# Patient Record
Sex: MALE | Race: WHITE | Employment: FULL TIME | URBAN - METROPOLITAN AREA
[De-identification: names, ages, dates, MRNs, and addresses within clinical notes are randomized per-mention and may not be internally consistent; named-entity substitution may affect disease eponyms.]

---

## 2017-04-20 ENCOUNTER — APPOINTMENT (OUTPATIENT)
Dept: CT IMAGING | Age: 47
End: 2017-04-20
Attending: PHYSICIAN ASSISTANT
Payer: COMMERCIAL

## 2017-04-20 ENCOUNTER — HOSPITAL ENCOUNTER (EMERGENCY)
Age: 47
Discharge: HOME | End: 2017-04-20
Attending: EMERGENCY MEDICINE
Payer: COMMERCIAL

## 2017-04-20 VITALS
OXYGEN SATURATION: 96 % | WEIGHT: 300 LBS | HEIGHT: 72 IN | DIASTOLIC BLOOD PRESSURE: 90 MMHG | RESPIRATION RATE: 18 BRPM | BODY MASS INDEX: 40.63 KG/M2 | TEMPERATURE: 98 F | SYSTOLIC BLOOD PRESSURE: 143 MMHG | HEART RATE: 91 BPM

## 2017-04-20 DIAGNOSIS — B27.90 INFECTIOUS MONONUCLEOSIS WITHOUT COMPLICATION, INFECTIOUS MONONUCLEOSIS DUE TO UNSPECIFIED ORGANISM: Primary | ICD-10-CM

## 2017-04-20 PROCEDURE — 99284 EMERGENCY DEPT VISIT MOD MDM: CPT

## 2017-04-20 PROCEDURE — 80048 BASIC METABOLIC PNL TOTAL CA: CPT | Performed by: PHYSICIAN ASSISTANT

## 2017-04-20 PROCEDURE — 87430 STREP A AG IA: CPT | Performed by: PHYSICIAN ASSISTANT

## 2017-04-20 PROCEDURE — 70491 CT SOFT TISSUE NECK W/DYE: CPT

## 2017-04-20 PROCEDURE — 87081 CULTURE SCREEN ONLY: CPT | Performed by: PHYSICIAN ASSISTANT

## 2017-04-20 PROCEDURE — 96374 THER/PROPH/DIAG INJ IV PUSH: CPT

## 2017-04-20 PROCEDURE — 86403 PARTICLE AGGLUT ANTBDY SCRN: CPT | Performed by: PHYSICIAN ASSISTANT

## 2017-04-20 PROCEDURE — 87798 DETECT AGENT NOS DNA AMP: CPT | Performed by: PHYSICIAN ASSISTANT

## 2017-04-20 PROCEDURE — 85025 COMPLETE CBC W/AUTO DIFF WBC: CPT | Performed by: PHYSICIAN ASSISTANT

## 2017-04-20 RX ORDER — DEXAMETHASONE SODIUM PHOSPHATE 4 MG/ML
10 VIAL (ML) INJECTION ONCE
Status: COMPLETED | OUTPATIENT
Start: 2017-04-20 | End: 2017-04-20

## 2017-04-20 RX ORDER — HYDROCODONE BITARTRATE AND ACETAMINOPHEN 5; 325 MG/1; MG/1
1-2 TABLET ORAL EVERY 4 HOURS PRN
Qty: 20 TABLET | Refills: 0 | Status: SHIPPED | OUTPATIENT
Start: 2017-04-20 | End: 2017-04-30

## 2017-04-20 RX ORDER — KETOROLAC TROMETHAMINE 30 MG/ML
30 INJECTION, SOLUTION INTRAMUSCULAR; INTRAVENOUS ONCE
Status: COMPLETED | OUTPATIENT
Start: 2017-04-20 | End: 2017-04-20

## 2017-04-20 NOTE — ED PROVIDER NOTES
Patient Seen in: [facility] Emergency Department In [place]    History   Patient presents with:  Cough/URI  Lymph Node    Stated Complaint: sinus congestion, swelling to lymph node in neck    HPI    Ronaldo Lane is a 55-year-old male who presents for evaluation o 136.079 kg  BMI 40.68 kg/m2  SpO2 96%        Physical Exam   Constitutional: He is oriented to person, place, and time. He appears well-developed and well-nourished. No distress. HENT:   Head: Normocephalic and atraumatic.    Right Ear: Tympanic membrane, created for panel order CBC WITH DIFFERENTIAL WITH PLATELET.   Procedure                               Abnormality         Status                     ---------                               -----------         ------                     CBC W/ DIFFERENTIAL[ Prominent bilateral lymph nodes, left greater than right, which likely correspond to palpable abnormality.     Dictated by: Ventura Salamanca MD on 4/20/2017 at 16:54     Approved by: Ventura Salamanca MD            This case is discussed with Dr. Coral Borges who e

## 2017-04-20 NOTE — ED INITIAL ASSESSMENT (HPI)
Pt states he has sinus congestion for the past 4 days with sore throat and swelling to lymph node to lt ant neck.

## 2017-04-22 RX ORDER — AMOXICILLIN 875 MG/1
875 TABLET, COATED ORAL 2 TIMES DAILY
Qty: 20 TABLET | Refills: 0 | Status: SHIPPED | OUTPATIENT
Start: 2017-04-22 | End: 2017-05-02

## 2017-04-24 NOTE — ED NOTES
Unable to reach patient invalid number in chart. RX for amox sent by certified letter to patient home.

## 2017-07-11 PROBLEM — R94.31 ABNORMAL EKG: Status: ACTIVE | Noted: 2017-07-11

## 2017-07-11 PROBLEM — Z01.810 PRE-OPERATIVE CARDIOVASCULAR EXAMINATION: Status: ACTIVE | Noted: 2017-07-11

## 2023-01-23 ENCOUNTER — OFFICE VISIT (OUTPATIENT)
Dept: FAMILY MEDICINE CLINIC | Facility: CLINIC | Age: 53
End: 2023-01-23
Payer: COMMERCIAL

## 2023-01-23 VITALS
BODY MASS INDEX: 42.66 KG/M2 | OXYGEN SATURATION: 98 % | HEART RATE: 96 BPM | WEIGHT: 315 LBS | HEIGHT: 72 IN | RESPIRATION RATE: 16 BRPM | SYSTOLIC BLOOD PRESSURE: 110 MMHG | DIASTOLIC BLOOD PRESSURE: 80 MMHG

## 2023-01-23 DIAGNOSIS — Z00.00 ROUTINE GENERAL MEDICAL EXAMINATION AT A HEALTH CARE FACILITY: Primary | ICD-10-CM

## 2023-01-23 DIAGNOSIS — E78.2 MIXED HYPERLIPIDEMIA: ICD-10-CM

## 2023-01-23 DIAGNOSIS — Z12.11 SCREENING FOR COLON CANCER: ICD-10-CM

## 2023-01-23 DIAGNOSIS — I10 HYPERTENSION, UNSPECIFIED TYPE: ICD-10-CM

## 2023-01-23 DIAGNOSIS — E11.9 TYPE 2 DIABETES MELLITUS WITHOUT COMPLICATION, WITHOUT LONG-TERM CURRENT USE OF INSULIN (HCC): ICD-10-CM

## 2023-01-23 DIAGNOSIS — Z12.5 SCREENING FOR PROSTATE CANCER: ICD-10-CM

## 2023-01-23 DIAGNOSIS — Z00.00 LABORATORY EXAM ORDERED AS PART OF ROUTINE GENERAL MEDICAL EXAMINATION: ICD-10-CM

## 2023-01-23 PROCEDURE — 3074F SYST BP LT 130 MM HG: CPT | Performed by: NURSE PRACTITIONER

## 2023-01-23 PROCEDURE — 3079F DIAST BP 80-89 MM HG: CPT | Performed by: NURSE PRACTITIONER

## 2023-01-23 PROCEDURE — 3008F BODY MASS INDEX DOCD: CPT | Performed by: NURSE PRACTITIONER

## 2023-01-23 PROCEDURE — 99386 PREV VISIT NEW AGE 40-64: CPT | Performed by: NURSE PRACTITIONER

## 2023-01-23 RX ORDER — ATORVASTATIN CALCIUM 10 MG/1
10 TABLET, FILM COATED ORAL DAILY
Qty: 90 TABLET | Refills: 1 | Status: SHIPPED | OUTPATIENT
Start: 2023-01-23

## 2023-01-23 RX ORDER — TAMSULOSIN HYDROCHLORIDE 0.4 MG/1
0.4 CAPSULE ORAL DAILY
Qty: 90 CAPSULE | Refills: 1 | Status: SHIPPED | OUTPATIENT
Start: 2023-01-23 | End: 2023-04-23

## 2023-01-23 RX ORDER — BLOOD-GLUCOSE METER
1 EACH MISCELLANEOUS 2 TIMES DAILY
Qty: 1 KIT | Refills: 0 | COMMUNITY
Start: 2023-01-23 | End: 2024-01-23

## 2023-01-23 RX ORDER — BLOOD SUGAR DIAGNOSTIC
STRIP MISCELLANEOUS
Qty: 100 EACH | Refills: 2 | Status: SHIPPED | OUTPATIENT
Start: 2023-01-23

## 2023-01-23 RX ORDER — ATORVASTATIN CALCIUM 10 MG/1
10 TABLET, FILM COATED ORAL DAILY
COMMUNITY
Start: 2022-08-02 | End: 2023-01-23

## 2023-01-23 RX ORDER — LISINOPRIL AND HYDROCHLOROTHIAZIDE 25; 20 MG/1; MG/1
1 TABLET ORAL DAILY
Qty: 90 TABLET | Refills: 1 | Status: SHIPPED | OUTPATIENT
Start: 2023-01-23

## 2023-01-23 RX ORDER — LANCETS 33 GAUGE
1 EACH MISCELLANEOUS DAILY
Qty: 100 EACH | Refills: 2 | Status: SHIPPED | OUTPATIENT
Start: 2023-01-23 | End: 2024-01-23

## 2023-01-23 RX ORDER — TADALAFIL 20 MG/1
TABLET ORAL
COMMUNITY
Start: 2022-08-21

## 2023-01-23 RX ORDER — LISINOPRIL AND HYDROCHLOROTHIAZIDE 25; 20 MG/1; MG/1
1 TABLET ORAL DAILY
COMMUNITY
Start: 2022-08-02 | End: 2023-01-23

## 2023-01-24 ENCOUNTER — LAB ENCOUNTER (OUTPATIENT)
Dept: LAB | Age: 53
End: 2023-01-24
Attending: NURSE PRACTITIONER
Payer: COMMERCIAL

## 2023-01-24 DIAGNOSIS — I10 HYPERTENSION, UNSPECIFIED TYPE: ICD-10-CM

## 2023-01-24 DIAGNOSIS — E78.2 MIXED HYPERLIPIDEMIA: ICD-10-CM

## 2023-01-24 DIAGNOSIS — Z00.00 LABORATORY EXAM ORDERED AS PART OF ROUTINE GENERAL MEDICAL EXAMINATION: ICD-10-CM

## 2023-01-24 DIAGNOSIS — Z12.5 SCREENING FOR PROSTATE CANCER: ICD-10-CM

## 2023-01-24 LAB
ALBUMIN SERPL-MCNC: 3.9 G/DL (ref 3.4–5)
ALBUMIN/GLOB SERPL: 1.1 {RATIO} (ref 1–2)
ALP LIVER SERPL-CCNC: 44 U/L
ALT SERPL-CCNC: 56 U/L
AMYLASE SERPL-CCNC: 65 U/L (ref 25–115)
ANION GAP SERPL CALC-SCNC: 8 MMOL/L (ref 0–18)
AST SERPL-CCNC: 35 U/L (ref 15–37)
BASOPHILS # BLD AUTO: 0.03 X10(3) UL (ref 0–0.2)
BASOPHILS NFR BLD AUTO: 0.7 %
BILIRUB SERPL-MCNC: 0.6 MG/DL (ref 0.1–2)
BUN BLD-MCNC: 13 MG/DL (ref 7–18)
CALCIUM BLD-MCNC: 9.1 MG/DL (ref 8.5–10.1)
CHLORIDE SERPL-SCNC: 108 MMOL/L (ref 98–112)
CHOLEST SERPL-MCNC: 149 MG/DL (ref ?–200)
CO2 SERPL-SCNC: 25 MMOL/L (ref 21–32)
COMPLEXED PSA SERPL-MCNC: 0.3 NG/ML (ref ?–4)
CREAT BLD-MCNC: 0.78 MG/DL
CREAT UR-SCNC: 231 MG/DL
EOSINOPHIL # BLD AUTO: 0.04 X10(3) UL (ref 0–0.7)
EOSINOPHIL NFR BLD AUTO: 0.9 %
ERYTHROCYTE [DISTWIDTH] IN BLOOD BY AUTOMATED COUNT: 12.9 %
EST. AVERAGE GLUCOSE BLD GHB EST-MCNC: 146 MG/DL (ref 68–126)
FASTING PATIENT LIPID ANSWER: YES
FASTING STATUS PATIENT QL REPORTED: YES
GFR SERPLBLD BASED ON 1.73 SQ M-ARVRAT: 107 ML/MIN/1.73M2 (ref 60–?)
GLOBULIN PLAS-MCNC: 3.7 G/DL (ref 2.8–4.4)
GLUCOSE BLD-MCNC: 124 MG/DL (ref 70–99)
HBA1C MFR BLD: 6.7 % (ref ?–5.7)
HCT VFR BLD AUTO: 46.5 %
HDLC SERPL-MCNC: 40 MG/DL (ref 40–59)
HGB BLD-MCNC: 15.6 G/DL
IMM GRANULOCYTES # BLD AUTO: 0.01 X10(3) UL (ref 0–1)
IMM GRANULOCYTES NFR BLD: 0.2 %
LDLC SERPL CALC-MCNC: 93 MG/DL (ref ?–100)
LIPASE SERPL-CCNC: 296 U/L (ref 73–393)
LYMPHOCYTES # BLD AUTO: 1.65 X10(3) UL (ref 1–4)
LYMPHOCYTES NFR BLD AUTO: 38.7 %
MCH RBC QN AUTO: 28.7 PG (ref 26–34)
MCHC RBC AUTO-ENTMCNC: 33.5 G/DL (ref 31–37)
MCV RBC AUTO: 85.6 FL
MICROALBUMIN UR-MCNC: 2.08 MG/DL
MICROALBUMIN/CREAT 24H UR-RTO: 9 UG/MG (ref ?–30)
MONOCYTES # BLD AUTO: 0.34 X10(3) UL (ref 0.1–1)
MONOCYTES NFR BLD AUTO: 8 %
NEUTROPHILS # BLD AUTO: 2.19 X10 (3) UL (ref 1.5–7.7)
NEUTROPHILS # BLD AUTO: 2.19 X10(3) UL (ref 1.5–7.7)
NEUTROPHILS NFR BLD AUTO: 51.5 %
NONHDLC SERPL-MCNC: 109 MG/DL (ref ?–130)
OSMOLALITY SERPL CALC.SUM OF ELEC: 294 MOSM/KG (ref 275–295)
PLATELET # BLD AUTO: 101 10(3)UL (ref 150–450)
POTASSIUM SERPL-SCNC: 3.9 MMOL/L (ref 3.5–5.1)
PROT SERPL-MCNC: 7.6 G/DL (ref 6.4–8.2)
RBC # BLD AUTO: 5.43 X10(6)UL
SODIUM SERPL-SCNC: 141 MMOL/L (ref 136–145)
TRIGL SERPL-MCNC: 84 MG/DL (ref 30–149)
TSI SER-ACNC: 1.57 MIU/ML (ref 0.36–3.74)
VIT D+METAB SERPL-MCNC: 28.7 NG/ML (ref 30–100)
VLDLC SERPL CALC-MCNC: 14 MG/DL (ref 0–30)
WBC # BLD AUTO: 4.3 X10(3) UL (ref 4–11)

## 2023-01-24 PROCEDURE — 82570 ASSAY OF URINE CREATININE: CPT | Performed by: NURSE PRACTITIONER

## 2023-01-24 PROCEDURE — 80061 LIPID PANEL: CPT | Performed by: NURSE PRACTITIONER

## 2023-01-24 PROCEDURE — 83690 ASSAY OF LIPASE: CPT | Performed by: NURSE PRACTITIONER

## 2023-01-24 PROCEDURE — 84153 ASSAY OF PSA TOTAL: CPT | Performed by: NURSE PRACTITIONER

## 2023-01-24 PROCEDURE — 83036 HEMOGLOBIN GLYCOSYLATED A1C: CPT | Performed by: NURSE PRACTITIONER

## 2023-01-24 PROCEDURE — 80050 GENERAL HEALTH PANEL: CPT | Performed by: NURSE PRACTITIONER

## 2023-01-24 PROCEDURE — 82150 ASSAY OF AMYLASE: CPT | Performed by: NURSE PRACTITIONER

## 2023-01-24 PROCEDURE — 3061F NEG MICROALBUMINURIA REV: CPT | Performed by: FAMILY MEDICINE

## 2023-01-24 PROCEDURE — 82043 UR ALBUMIN QUANTITATIVE: CPT | Performed by: NURSE PRACTITIONER

## 2023-01-24 PROCEDURE — 3044F HG A1C LEVEL LT 7.0%: CPT | Performed by: FAMILY MEDICINE

## 2023-01-24 PROCEDURE — 82306 VITAMIN D 25 HYDROXY: CPT | Performed by: NURSE PRACTITIONER

## 2023-01-25 ENCOUNTER — TELEPHONE (OUTPATIENT)
Dept: FAMILY MEDICINE CLINIC | Facility: CLINIC | Age: 53
End: 2023-01-25

## 2023-01-25 NOTE — TELEPHONE ENCOUNTER
Patient come in and filled out JASMIN to have his labs from 1/24/23 faxed to Dr. Michelle Lucas [phone]    Telephone [phone]

## 2023-01-25 NOTE — TELEPHONE ENCOUNTER
Explained lab results from 1/24/2023. Patient verbalized understanding. Patient also stated that he would like to establish care at [facility]. Notified patient that Dr. Keyur Ruiz is accepting new patients. Patient stated that he would like Dr. Keyur Ruiz as PCP. Advised to schedule F/U appointment with PCP. Patient verbalized understanding and agreed. Appointment scheduled for 2/24/2023.

## 2023-01-25 NOTE — TELEPHONE ENCOUNTER
----- Message from KOFFI Wallace sent at 1/25/2023  8:17 AM CST -----  Labs are good , continue medications as prescribed

## 2023-01-26 ENCOUNTER — OFFICE VISIT (OUTPATIENT)
Facility: LOCATION | Age: 53
End: 2023-01-26
Payer: COMMERCIAL

## 2023-01-26 VITALS — TEMPERATURE: 97 F | HEART RATE: 83 BPM

## 2023-01-26 DIAGNOSIS — Z12.11 ENCOUNTER FOR SCREENING COLONOSCOPY: Primary | ICD-10-CM

## 2023-01-26 PROCEDURE — S0285 CNSLT BEFORE SCREEN COLONOSC: HCPCS | Performed by: PHYSICIAN ASSISTANT

## 2023-01-26 RX ORDER — POLYETHYLENE GLYCOL 3350, SODIUM CHLORIDE, SODIUM BICARBONATE, POTASSIUM CHLORIDE 420; 11.2; 5.72; 1.48 G/4L; G/4L; G/4L; G/4L
POWDER, FOR SOLUTION ORAL
Qty: 1 EACH | Refills: 0 | Status: SHIPPED | OUTPATIENT
Start: 2023-01-26

## 2023-01-26 NOTE — PROGRESS NOTES
New Patient Visit Note       Active Problems      1. Encounter for screening colonoscopy        Chief Complaint   Patient presents with:  Colonoscopy: NP- Cscope Consult- Pt. Denies rectal pain or bleeding, denies previous colonoscopies, denies family hx of colon cancer, BMs are normal.       PCP  Silvio Baker MD     History of Present Illness   Hugo Power is a 46year old male who presents for evaluation for colonoscopy. The patient has not had previous colonoscopy. The patient's family history is negative for colon disease or cancer . The patient denies nausea, vomiting, abdominal pain or cramping or bloating, diarrhea, constipation, bright red blood in the stool, dark tarry stools, other recent changes in bowel habits, or recent weight loss. The patient states he typically has at least one bowel movement daily. He states it is formed. The patient denies stool softeners or fiber supplements. The patient denies any past medical history. The patient's past surgical history includes gastric lap band which was later removed and a gastric sleeve. The patient takes no blood thinners. .      Past Medical / Surgical / Social / Family History    The past medical history, past surgical history, family history, social history, allergies, and medications have been reviewed by me today. Tadalafil 20 MG Oral Tab, 1 (ONE) TABLET AS NEEDED 2 HOURS PRIOR TO ACTIVITY  metFORMIN 500 MG Oral Tab, Take 1 tablet (500 mg total) by mouth daily with breakfast.  atorvastatin 10 MG Oral Tab, Take 1 tablet (10 mg total) by mouth daily. lisinopril-hydroCHLOROthiazide 20-25 MG Oral Tab, Take 1 tablet by mouth daily. semaglutide 2 MG/1.5ML Subcutaneous Solution Pen-injector, Inject 0.5 mg into the skin once a week. tamsulosin 0.4 MG Oral Cap, Take 1 capsule (0.4 mg total) by mouth daily.   Glucose Blood (ONETOUCH ULTRA) In Vitro Strip, Check blood sugar once daily  Blood Glucose Monitoring Suppl (ONETOUCH ULTRA 2) w/Device Does not apply Kit, 1 Device by Other route 2 (two) times daily. Use as directed. OneTouch Delica Lancets 85L Does not apply Misc, 1 lancet. by Finger stick route daily. No current facility-administered medications on file prior to visit. Review of Systems  Constitutional: No Fever, No Chills, No Diaphoresis, No fatigue, No weight loss, No anorexia  Eyes: No burry vision, No icterus  ENT: No tinnitus, No rhinorrhea, No dysphagia, No odynophagia  Respiratory: No dyspnea, No wheezing  Cardiovascular: No chest pain, No palpitations, No leg swelling  Gastrointestinal: No abdominal pain, No abdominal distention, No bloating, No nausea, No vomiting, No diarrhea, No constipation, No melena, No hematochezia  Endocrine: No polydipsia, No polyuria  Genitoruinary: No dysuria, No Hematuria  Musculoskeletal: No joint pain, No muscle pain  Neurologic: No dizzyness, No syncope, No headaches, No numbness  Hematologic: No easy bruising, No easy bleeding  Psychiatric: No anxiety, No depression      Physical Findings   There were no vitals taken for this visit. Constitutional: Well nourished, well kempt  Eyes: EOMI, no scleral icterus  ENT: Nares moist, oropharynx clear  Neck: Supple, no tracheal deviation   Cardiac: Normal rate, no JVD  Respiratory: No respiratory distress, No audible wheezing  Abdominal: Soft, Nontender, Nondistended  Muskuloskeletal: Normal gait, Full ROM in all extremities  Lymphatic: no cervical or supraclavicular adenopathy  Skin: No rashes,  No lesions  Neurologic: No focal deficits  Psych: Appropriate mood and affect, oriented x3            Assessment   Encounter for screening colonoscopy  (primary encounter diagnosis)      Plan   The patient is 46year old and has not had a colonoscopy. The patient's family history is negative for colon disease or cancer. The patient does not have gastrointestinal symptoms.      Recommend proceeding with colonoscopy on April 5 with  Shayy Coy.    The benefits of colonoscopy, including detection of cancer and polyp removal prior to progression to cancer were discussed. The details of the procedure which include navigation of the colonoscope through the anus, rectum, and colon to evaluate the mucosa and removal of any polyps encountered were discussed as well. The risks of colonoscopy were discussed with the patient and include but are not limited to post-procedure bleeding, infection, colorectal perforation, splenic injury, missed polyps, need for additional surgeries or interventions. All questions were answered and the patient voiced understanding and agreed to proceed with colonoscopy.             Bhakti Adam PA-C

## 2023-02-24 ENCOUNTER — OFFICE VISIT (OUTPATIENT)
Dept: FAMILY MEDICINE CLINIC | Facility: CLINIC | Age: 53
End: 2023-02-24
Payer: COMMERCIAL

## 2023-02-24 VITALS
DIASTOLIC BLOOD PRESSURE: 74 MMHG | WEIGHT: 313 LBS | BODY MASS INDEX: 42.39 KG/M2 | SYSTOLIC BLOOD PRESSURE: 114 MMHG | HEIGHT: 72 IN | HEART RATE: 92 BPM | OXYGEN SATURATION: 97 % | RESPIRATION RATE: 18 BRPM

## 2023-02-24 DIAGNOSIS — I10 ESSENTIAL HYPERTENSION: ICD-10-CM

## 2023-02-24 DIAGNOSIS — D69.6 THROMBOCYTOPENIA (HCC): ICD-10-CM

## 2023-02-24 DIAGNOSIS — Z00.00 LABORATORY EXAMINATION ORDERED AS PART OF A ROUTINE GENERAL MEDICAL EXAMINATION: ICD-10-CM

## 2023-02-24 DIAGNOSIS — E78.2 MIXED HYPERLIPIDEMIA: ICD-10-CM

## 2023-02-24 DIAGNOSIS — E11.9 TYPE 2 DIABETES MELLITUS WITHOUT COMPLICATION, WITHOUT LONG-TERM CURRENT USE OF INSULIN (HCC): Primary | ICD-10-CM

## 2023-02-24 DIAGNOSIS — G47.33 OBSTRUCTIVE SLEEP APNEA SYNDROME: ICD-10-CM

## 2023-02-24 DIAGNOSIS — Z23 NEED FOR PNEUMOCOCCAL VACCINATION: ICD-10-CM

## 2023-02-24 DIAGNOSIS — N40.0 BENIGN PROSTATIC HYPERPLASIA, UNSPECIFIED WHETHER LOWER URINARY TRACT SYMPTOMS PRESENT: ICD-10-CM

## 2023-02-24 PROCEDURE — 90472 IMMUNIZATION ADMIN EACH ADD: CPT | Performed by: FAMILY MEDICINE

## 2023-02-24 PROCEDURE — 90715 TDAP VACCINE 7 YRS/> IM: CPT | Performed by: FAMILY MEDICINE

## 2023-02-24 PROCEDURE — 3008F BODY MASS INDEX DOCD: CPT | Performed by: FAMILY MEDICINE

## 2023-02-24 PROCEDURE — 90471 IMMUNIZATION ADMIN: CPT | Performed by: FAMILY MEDICINE

## 2023-02-24 PROCEDURE — 3078F DIAST BP <80 MM HG: CPT | Performed by: FAMILY MEDICINE

## 2023-02-24 PROCEDURE — 99204 OFFICE O/P NEW MOD 45 MIN: CPT | Performed by: FAMILY MEDICINE

## 2023-02-24 PROCEDURE — 90677 PCV20 VACCINE IM: CPT | Performed by: FAMILY MEDICINE

## 2023-02-24 PROCEDURE — 3074F SYST BP LT 130 MM HG: CPT | Performed by: FAMILY MEDICINE

## 2023-02-28 ENCOUNTER — TELEPHONE (OUTPATIENT)
Dept: HEMATOLOGY/ONCOLOGY | Facility: HOSPITAL | Age: 53
End: 2023-02-28

## 2023-03-13 ENCOUNTER — OFFICE VISIT (OUTPATIENT)
Dept: HEMATOLOGY/ONCOLOGY | Age: 53
End: 2023-03-13
Attending: INTERNAL MEDICINE
Payer: COMMERCIAL

## 2023-03-13 VITALS
WEIGHT: 306.88 LBS | TEMPERATURE: 97 F | DIASTOLIC BLOOD PRESSURE: 74 MMHG | RESPIRATION RATE: 18 BRPM | HEART RATE: 84 BPM | BODY MASS INDEX: 42 KG/M2 | SYSTOLIC BLOOD PRESSURE: 110 MMHG | OXYGEN SATURATION: 93 %

## 2023-03-13 DIAGNOSIS — D69.6 THROMBOCYTOPENIA (HCC): Primary | ICD-10-CM

## 2023-03-13 LAB
FOLATE SERPL-MCNC: 16.2 NG/ML (ref 8.7–?)
HBV CORE AB SERPL QL IA: NONREACTIVE
HBV SURFACE AB SER QL: NONREACTIVE
HBV SURFACE AB SERPL IA-ACNC: <3.1 MIU/ML
HBV SURFACE AG SER-ACNC: <0.1 [IU]/L
HBV SURFACE AG SERPL QL IA: NONREACTIVE
HCV AB SERPL QL IA: NONREACTIVE
PLATELET # BLD AUTO: 107.8 10(3)UL (ref 150–450)
VIT B12 SERPL-MCNC: 986 PG/ML (ref 193–986)

## 2023-03-13 PROCEDURE — 99244 OFF/OP CNSLTJ NEW/EST MOD 40: CPT | Performed by: INTERNAL MEDICINE

## 2023-03-13 NOTE — PROGRESS NOTES
Here for MD consultation for chronic thrombocytopenia  Denies active bleeding  Denies increase bruising  No pain. Meds reviewed.    Education Record    Learner:  Patient    Disease / Diagnosis:    Barriers / Limitations:  None   Comments:    Method:  Brief focused  Comments:    General Topics:   Plan of care reviewed   Comments:    Outcome:  Shows understanding   Comments:

## 2023-04-10 ENCOUNTER — TELEPHONE (OUTPATIENT)
Dept: FAMILY MEDICINE CLINIC | Facility: CLINIC | Age: 53
End: 2023-04-10

## 2023-04-10 NOTE — TELEPHONE ENCOUNTER
Diabetic eye exam report dated 3/28/2023 was received via fax from [facility]. HM updated. Report placed in Dr Nolvia Diaz in-box pending review.

## 2023-04-20 ENCOUNTER — TELEPHONE (OUTPATIENT)
Facility: LOCATION | Age: 53
End: 2023-04-20

## 2023-04-28 ENCOUNTER — TELEPHONE (OUTPATIENT)
Facility: LOCATION | Age: 53
End: 2023-04-28

## 2023-07-19 DIAGNOSIS — I10 HYPERTENSION, UNSPECIFIED TYPE: ICD-10-CM

## 2023-07-19 DIAGNOSIS — E78.2 MIXED HYPERLIPIDEMIA: ICD-10-CM

## 2023-07-19 RX ORDER — ATORVASTATIN CALCIUM 10 MG/1
10 TABLET, FILM COATED ORAL DAILY
Qty: 90 TABLET | Refills: 1 | Status: SHIPPED | OUTPATIENT
Start: 2023-07-19

## 2023-07-19 RX ORDER — LISINOPRIL AND HYDROCHLOROTHIAZIDE 25; 20 MG/1; MG/1
1 TABLET ORAL DAILY
Qty: 90 TABLET | Refills: 1 | Status: SHIPPED | OUTPATIENT
Start: 2023-07-19

## 2023-07-19 RX ORDER — TAMSULOSIN HYDROCHLORIDE 0.4 MG/1
0.4 CAPSULE ORAL DAILY
Qty: 90 CAPSULE | Refills: 1 | Status: SHIPPED | OUTPATIENT
Start: 2023-07-19

## 2023-10-31 ENCOUNTER — MED REC SCAN ONLY (OUTPATIENT)
Dept: FAMILY MEDICINE CLINIC | Facility: CLINIC | Age: 53
End: 2023-10-31

## 2024-01-24 ENCOUNTER — OFFICE VISIT (OUTPATIENT)
Dept: FAMILY MEDICINE CLINIC | Facility: CLINIC | Age: 54
End: 2024-01-24
Payer: COMMERCIAL

## 2024-01-24 ENCOUNTER — TELEPHONE (OUTPATIENT)
Dept: FAMILY MEDICINE CLINIC | Facility: CLINIC | Age: 54
End: 2024-01-24

## 2024-01-24 VITALS
DIASTOLIC BLOOD PRESSURE: 78 MMHG | RESPIRATION RATE: 18 BRPM | SYSTOLIC BLOOD PRESSURE: 118 MMHG | WEIGHT: 314 LBS | HEIGHT: 72 IN | BODY MASS INDEX: 42.53 KG/M2 | OXYGEN SATURATION: 98 % | HEART RATE: 73 BPM

## 2024-01-24 DIAGNOSIS — E78.2 MIXED HYPERLIPIDEMIA: ICD-10-CM

## 2024-01-24 DIAGNOSIS — Z12.11 SCREENING FOR COLON CANCER: ICD-10-CM

## 2024-01-24 DIAGNOSIS — I10 HYPERTENSION, UNSPECIFIED TYPE: ICD-10-CM

## 2024-01-24 DIAGNOSIS — Z00.00 WELLNESS EXAMINATION: Primary | ICD-10-CM

## 2024-01-24 DIAGNOSIS — Z23 NEED FOR SHINGLES VACCINE: ICD-10-CM

## 2024-01-24 DIAGNOSIS — E11.9 TYPE 2 DIABETES MELLITUS WITHOUT COMPLICATION, WITHOUT LONG-TERM CURRENT USE OF INSULIN (HCC): ICD-10-CM

## 2024-01-24 DIAGNOSIS — Z00.00 LABORATORY EXAMINATION ORDERED AS PART OF A ROUTINE GENERAL MEDICAL EXAMINATION: ICD-10-CM

## 2024-01-24 LAB
CARTRIDGE LOT#: 648 NUMERIC
HEMOGLOBIN A1C: 7.4 % (ref 4.3–5.6)

## 2024-01-24 PROCEDURE — 3074F SYST BP LT 130 MM HG: CPT | Performed by: FAMILY MEDICINE

## 2024-01-24 PROCEDURE — 3078F DIAST BP <80 MM HG: CPT | Performed by: FAMILY MEDICINE

## 2024-01-24 PROCEDURE — 3008F BODY MASS INDEX DOCD: CPT | Performed by: FAMILY MEDICINE

## 2024-01-24 PROCEDURE — 90750 HZV VACC RECOMBINANT IM: CPT | Performed by: FAMILY MEDICINE

## 2024-01-24 PROCEDURE — 3051F HG A1C>EQUAL 7.0%<8.0%: CPT | Performed by: FAMILY MEDICINE

## 2024-01-24 PROCEDURE — 99214 OFFICE O/P EST MOD 30 MIN: CPT | Performed by: FAMILY MEDICINE

## 2024-01-24 PROCEDURE — 99396 PREV VISIT EST AGE 40-64: CPT | Performed by: FAMILY MEDICINE

## 2024-01-24 PROCEDURE — 90471 IMMUNIZATION ADMIN: CPT | Performed by: FAMILY MEDICINE

## 2024-01-24 PROCEDURE — 83036 HEMOGLOBIN GLYCOSYLATED A1C: CPT | Performed by: FAMILY MEDICINE

## 2024-01-24 RX ORDER — LISINOPRIL AND HYDROCHLOROTHIAZIDE 25; 20 MG/1; MG/1
1 TABLET ORAL DAILY
Qty: 90 TABLET | Refills: 1 | Status: SHIPPED | OUTPATIENT
Start: 2024-01-24

## 2024-01-24 RX ORDER — TIRZEPATIDE 2.5 MG/.5ML
2.5 INJECTION, SOLUTION SUBCUTANEOUS WEEKLY
Qty: 2 ML | Refills: 0 | Status: SHIPPED | OUTPATIENT
Start: 2024-01-24

## 2024-01-24 RX ORDER — ATORVASTATIN CALCIUM 10 MG/1
10 TABLET, FILM COATED ORAL DAILY
Qty: 90 TABLET | Refills: 1 | Status: SHIPPED | OUTPATIENT
Start: 2024-01-24

## 2024-01-24 RX ORDER — VARDENAFIL HYDROCHLORIDE 20 MG/1
20 TABLET ORAL DAILY PRN
COMMUNITY
Start: 2024-01-14

## 2024-01-24 NOTE — PROGRESS NOTES
HPI:   Randy Eubanks is a 53 year old male who presents for an Annual Health Visit/follow up.    T2DM - stopped ozempic as he felt it wasn't working for him for weight loss. He has been compliant w/ metformin. Denies any side effects. Has been monitoring blood sugar, FBG ~ 120s.  No new numbness, weakness. Feels feet are cold. Denies any hypoglycemic episodes.     Allergies:   No Known Allergies    CURRENT MEDICATIONS   Current Outpatient Medications   Medication Sig Dispense Refill    Vardenafil HCl 20 MG Oral Tab Take 1 tablet (20 mg total) by mouth daily as needed for Erectile Dysfunction.      atorvastatin 10 MG Oral Tab Take 1 tablet (10 mg total) by mouth daily. 90 tablet 1    lisinopril-hydroCHLOROthiazide 20-25 MG Oral Tab Take 1 tablet by mouth daily. 90 tablet 1    metFORMIN 500 MG Oral Tab Take 1 tablet (500 mg total) by mouth daily with breakfast. 90 tablet 1    Tirzepatide (MOUNJARO) 2.5 MG/0.5ML Subcutaneous Solution Pen-injector Inject 2.5 mg into the skin once a week. 2 mL 0    tamsulosin 0.4 MG Oral Cap Take 1 capsule (0.4 mg total) by mouth daily. 90 capsule 1    Glucose Blood (ONETOUCH ULTRA) In Vitro Strip Check blood sugar once daily 100 each 2      HISTORICAL INFORMATION   No past medical history on file.   Past Surgical History:   Procedure Laterality Date    OTHER SURGICAL HISTORY  2009    gastric lap band       Family History   Problem Relation Age of Onset    Heart Attack Father     Heart Surgery Father     Diabetes Mother       SOCIAL HISTORY   Social History     Socioeconomic History    Marital status:    Tobacco Use    Smoking status: Former   Vaping Use    Vaping Use: Never used   Substance and Sexual Activity    Alcohol use: Yes     Comment: occ.    Drug use: No     Social History     Social History Narrative    Not on file        REVIEW OF SYSTEMS:     Constitutional: negative  Eyes: negative  ENT: negative  Respiratory: negative  Cardiovascular:  negative  Gastrointestinal: negative  Integument/Breast: negative  Genitourinary: negative  Heme/Lymph: negative  Musculoskeletal: negative  Neurological: negative  Psych: negative  Endocrine: negative  Allergic/Immune: negative    EXAM:   /78   Pulse 73   Resp 18   Ht 6' (1.829 m)   Wt (!) 314 lb (142.4 kg)   SpO2 98%   BMI 42.59 kg/m²    Wt Readings from Last 6 Encounters:   01/24/24 (!) 314 lb (142.4 kg)   03/13/23 (!) 306 lb 14.4 oz (139.2 kg)   02/24/23 (!) 313 lb (142 kg)   01/23/23 (!) 317 lb (143.8 kg)   07/11/17 (!) 332 lb (150.6 kg)   04/20/17 300 lb (136.1 kg)     Body mass index is 42.59 kg/m².    General: alert, appears stated age, and cooperative  Head: Normocephalic, without obvious abnormality, atraumatic  Eyes: negative  Ears: normal TM's and external ear canals both ears  Nose: Nares normal. Septum midline. Mucosa normal. No drainage or sinus tenderness.  Throat: lips, mucosa, and tongue normal; teeth and gums normal  Neck: no adenopathy, supple, symmetrical, trachea midline, and thyroid not enlarged, symmetric, no tenderness/mass/nodules  Heart: S1, S2 normal, no murmur, click, rub or gallop, regular rate and rhythm  Lungs: clear to auscultation bilaterally  Chest wall: no tenderness  Abdomen: soft, non-tender; bowel sounds normal; no masses,  no organomegaly  : deferred  Back: negative  Extremities: extremities normal, atraumatic, no cyanosis or edema  Pulses: 2+ and symmetric  Skin: Skin color, texture, turgor normal. No rashes or lesions  Lymph Nodes:  No LAD  Neurologic: Grossly normal    Bilateral barefoot skin diabetic exam is normal, visualized feet and the appearance is normal.  Bilateral monofilament/sensation of both feet is normal.  Pulsation pedal pulse exam of both lower legs/feet is normal as well.    ASSESSMENT AND PLAN:   Randy was seen today for well adult and diabetes.    Diagnoses and all orders for this visit:    Wellness examination  -Immunizations: Due for  shingrex  -Metabolic: BMI 42. BP wnl. Due for annual labs   -Cancer screening: due for CRC screening.   -Communicable disease: low risk   -Mental health: no concerns   -Other preventative: follow with dentistry and optometry.   -Lifestyle: Follow a well balanced healthy diet with emphasis on fruits, vegetables, whole grains, lean meats. Limit processed and junk foods. Aim for at least 150 minutes of moderate intensity exercise weekly. Make sure you are staying adequately hydrated. Aim to get 7-9 hours of sleep nightly.     Laboratory examination ordered as part of a routine general medical examination  -     CBC With Differential With Platelet; Future  -     Comp Metabolic Panel (14); Future  -     Lipid Panel; Future  -     TSH W Reflex To Free T4; Future    Type 2 diabetes mellitus without complication, without long-term current use of insulin (HCC)  A1c at 7.4, can benefit from better control. Will add on mounjaro. Continue metformin.   Goal: A1c < 7   Diet & exercise: diabetic diet, regular exericse.   Daily accucheks are appropriate  Hypoglycemic episodes?: none   Labs: UTD  CV: continue statin.   Renal: due   Eye: UTD  Foot: UTD  Immunizations: UTD  RTC in 3mo for T2DM follow up. Send us condition update in 1mo since we are adding on mounjaro, will titrate up as tolerated.     -     Microalb/Creat Ratio, Random Urine; Future  -     HEMOGLOBIN A1C  -     metFORMIN 500 MG Oral Tab; Take 1 tablet (500 mg total) by mouth daily with breakfast.  -     Tirzepatide (MOUNJARO) 2.5 MG/0.5ML Subcutaneous Solution Pen-injector; Inject 2.5 mg into the skin once a week.    Screening for colon cancer  -     COLOGUARD COLON CANCER SCREENING (EXTERNAL)    Mixed hyperlipidemia  Stable, CPM   -     atorvastatin 10 MG Oral Tab; Take 1 tablet (10 mg total) by mouth daily.    Hypertension, unspecified type  Stable, CPM  -     lisinopril-hydroCHLOROthiazide 20-25 MG Oral Tab; Take 1 tablet by mouth daily.    Need for shingles  vaccine  -     ZOSTER VACC RECOMBINANT IM NJX    Primary obstructive sleep apnea of   Intolerant of PAP therapy.   Interested in alternatives. He was referred to ENT for [facility] consultation. Will place referral for [facility] for oral appliance.       Patient Instructions     Health Screening Guidelines, Men Ages 50 to 64   Screening tests and health counseling are a key part of managing your health. A screening test is done to find disorders or diseases in people who don't have any symptoms. Screening tests are not used to diagnose. They are used to find out if more testing is needed. The goal may be to find a disease early so it can be treated with more success. Or the goal may be to find a disease early so you can make lifestyle changes. You may need regular checkups to help you reduce your risk of disease.   Below are guidelines for men ages 50 to 64. Talk with your healthcare provider. Make sure you’re up-to-date on what you need.   We understand gender is a spectrum. We may use gendered terms to talk about anatomy and health risk. Please use this information in a way that works best for you and your provider as you talk about your care.   Screening  Who needs it How often    Unhealthy alcohol use  All men in this age group  At routine exams   Blood pressure All men in this age group  Once a year if your blood pressure is normal. Normal blood pressure is less than 120/80 mm Hg. If your blood pressure is higher than this, follow the advice of your healthcare provider.    Colorectal cancer All men at average risk in this age group  Talk with your healthcare provider about which test below is right for you:   Colonoscopy every 10 years  Flexible sigmoidoscopy every 5 years (or every 10 years with yearly fecal immunochemical test (FIT) stool test)  CT colonography (virtual colonoscopy) every 5 years  Yearly fecal occult blood test  Yearly FIT  Stool DNA test every 1 to 3 years  If you have a test  that is not a colonoscopy and have an abnormal test result, you will need a colonoscopy.   You may need to be screened more or less often. This is based on personal or family health history. Talk with your healthcare provider.    Depression All men in this age group  At routine exams   Type 2 diabetes or prediabetes  All in this age group  At least every 3 years (yearly if your blood sugar has already begun to rise)    Type 2 diabetes All men with prediabetes  Every year   Hepatitis C Men at higher risk for infection. Test 1 time for men born between 1945 and 1965.  Talk with your healthcare provider.    High cholesterol or triglycerides  All men in this age group  At least every 4 to 6 years. Talk with your healthcare provider about your risk. Ask if you should be tested more often.    HIV All men in this age group  At least 1 time. Talk with your healthcare provider about your risk factors. Ask if you should be tested more often.    Lung cancer All men in this age group who are in fairly good health and are at higher risk for lung cancer, and who:   Smoke or quit in the past 15 years  Have a 20-pack per year smoking history (1 pack a day for 20 years or 2 packs a day for 10 years)  Expert groups vary in their advice. Talk with your healthcare provider.  Yearly lung cancer screening with a low-dose CT scan (LDCT). Talk with your healthcare provider.    Obesity All men in this age group  At yearly routine exams    BMI (body mass index) All men in this age group Every year, to help find out if you are at a healthy weight for your height    Prostate cancer All men in this age group, talk with your healthcare provider about risks and benefits of a digital rectal exam (BARBARA) and prostate-specific antigen (PSA) screening  At routine exams if you decide to be tested.    Syphilis Men at higher risk for infection  At routine exams. Talk with your healthcare provider.    Tuberculosis Men at higher risk for infection  Talk  with your healthcare provider    Vision All men in this age group  Talk with your healthcare provider   Health counseling  Who needs it  How often    Diet and exercise Men who are overweight or obese  When diagnosed, and then at routine exams    Sexually transmitted infection (STI) prevention  Men at higher risk for infection  At routine exams; talk with your healthcare provider    Use of tobacco and the health effects it can cause  All men in this age group  Every exam   Evri last reviewed this educational content on 2021 [employer]. All rights reserved. This information is not intended as a substitute for professional medical care. Always follow your healthcare professional's instructions.          The patient indicates understanding of these issues and agrees to the plan.    Problem List:  Patient Active Problem List   Diagnosis    Pre-operative cardiovascular examination    Abnormal EKG    Type 2 diabetes mellitus without complication, without long-term current use of insulin (HCC)    Thrombocytopenia (HCC)    Mixed hyperlipidemia    Essential hypertension    Benign prostatic hyperplasia    Primary obstructive sleep apnea of        Bryson Khan MD  2024  10:08 AM

## 2024-01-24 NOTE — PATIENT INSTRUCTIONS
Health Screening Guidelines, Men Ages 50 to 64   Screening tests and health counseling are a key part of managing your health. A screening test is done to find disorders or diseases in people who don't have any symptoms. Screening tests are not used to diagnose. They are used to find out if more testing is needed. The goal may be to find a disease early so it can be treated with more success. Or the goal may be to find a disease early so you can make lifestyle changes. You may need regular checkups to help you reduce your risk of disease.   Below are guidelines for men ages 50 to 64. Talk with your healthcare provider. Make sure you’re up-to-date on what you need.   We understand gender is a spectrum. We may use gendered terms to talk about anatomy and health risk. Please use this information in a way that works best for you and your provider as you talk about your care.   Screening  Who needs it How often    Unhealthy alcohol use  All men in this age group  At routine exams   Blood pressure All men in this age group  Once a year if your blood pressure is normal. Normal blood pressure is less than 120/80 mm Hg. If your blood pressure is higher than this, follow the advice of your healthcare provider.    Colorectal cancer All men at average risk in this age group  Talk with your healthcare provider about which test below is right for you:   Colonoscopy every 10 years  Flexible sigmoidoscopy every 5 years (or every 10 years with yearly fecal immunochemical test (FIT) stool test)  CT colonography (virtual colonoscopy) every 5 years  Yearly fecal occult blood test  Yearly FIT  Stool DNA test every 1 to 3 years  If you have a test that is not a colonoscopy and have an abnormal test result, you will need a colonoscopy.   You may need to be screened more or less often. This is based on personal or family health history. Talk with your healthcare provider.    Depression All men in this age group  At routine exams   Type 2  diabetes or prediabetes  All in this age group  At least every 3 years (yearly if your blood sugar has already begun to rise)    Type 2 diabetes All men with prediabetes  Every year   Hepatitis C Men at higher risk for infection. Test 1 time for men born between 1945 and 1965.  Talk with your healthcare provider.    High cholesterol or triglycerides  All men in this age group  At least every 4 to 6 years. Talk with your healthcare provider about your risk. Ask if you should be tested more often.    HIV All men in this age group  At least 1 time. Talk with your healthcare provider about your risk factors. Ask if you should be tested more often.    Lung cancer All men in this age group who are in fairly good health and are at higher risk for lung cancer, and who:   Smoke or quit in the past 15 years  Have a 20-pack per year smoking history (1 pack a day for 20 years or 2 packs a day for 10 years)  Expert groups vary in their advice. Talk with your healthcare provider.  Yearly lung cancer screening with a low-dose CT scan (LDCT). Talk with your healthcare provider.    Obesity All men in this age group  At yearly routine exams    BMI (body mass index) All men in this age group Every year, to help find out if you are at a healthy weight for your height    Prostate cancer All men in this age group, talk with your healthcare provider about risks and benefits of a digital rectal exam (BARBARA) and prostate-specific antigen (PSA) screening  At routine exams if you decide to be tested.    Syphilis Men at higher risk for infection  At routine exams. Talk with your healthcare provider.    Tuberculosis Men at higher risk for infection  Talk with your healthcare provider    Vision All men in this age group  Talk with your healthcare provider   Health counseling  Who needs it  How often    Diet and exercise Men who are overweight or obese  When diagnosed, and then at routine exams    Sexually transmitted infection (STI) prevention   Men at higher risk for infection  At routine exams; talk with your healthcare provider    Use of tobacco and the health effects it can cause  All men in this age group  Every exam   Kenan last reviewed this educational content on 5/1/2021 © 2000-2023 [employer]. All rights reserved. This information is not intended as a substitute for professional medical care. Always follow your healthcare professional's instructions.

## 2024-01-25 ENCOUNTER — LAB ENCOUNTER (OUTPATIENT)
Dept: LAB | Age: 54
End: 2024-01-25
Attending: FAMILY MEDICINE
Payer: COMMERCIAL

## 2024-01-25 DIAGNOSIS — Z00.00 LABORATORY EXAMINATION ORDERED AS PART OF A ROUTINE GENERAL MEDICAL EXAMINATION: ICD-10-CM

## 2024-01-25 DIAGNOSIS — E11.9 TYPE 2 DIABETES MELLITUS WITHOUT COMPLICATION, WITHOUT LONG-TERM CURRENT USE OF INSULIN (HCC): ICD-10-CM

## 2024-01-25 LAB
ALBUMIN SERPL-MCNC: 4 G/DL (ref 3.4–5)
ALBUMIN/GLOB SERPL: 1 {RATIO} (ref 1–2)
ALP LIVER SERPL-CCNC: 44 U/L
ANION GAP SERPL CALC-SCNC: 2 MMOL/L (ref 0–18)
AST SERPL-CCNC: 27 U/L (ref 15–37)
BASOPHILS # BLD AUTO: 0.03 X10(3) UL (ref 0–0.2)
BASOPHILS NFR BLD AUTO: 0.4 %
BILIRUB SERPL-MCNC: 1 MG/DL (ref 0.1–2)
BUN BLD-MCNC: 12 MG/DL (ref 9–23)
CALCIUM BLD-MCNC: 9.3 MG/DL (ref 8.5–10.1)
CHLORIDE SERPL-SCNC: 105 MMOL/L (ref 98–112)
CHOLEST SERPL-MCNC: 126 MG/DL (ref ?–200)
CO2 SERPL-SCNC: 32 MMOL/L (ref 21–32)
CREAT BLD-MCNC: 1.03 MG/DL
CREAT UR-SCNC: 278 MG/DL
EGFRCR SERPLBLD CKD-EPI 2021: 87 ML/MIN/1.73M2 (ref 60–?)
EOSINOPHIL # BLD AUTO: 0.08 X10(3) UL (ref 0–0.7)
EOSINOPHIL NFR BLD AUTO: 1.1 %
ERYTHROCYTE [DISTWIDTH] IN BLOOD BY AUTOMATED COUNT: 13.2 %
FASTING PATIENT LIPID ANSWER: YES
FASTING STATUS PATIENT QL REPORTED: YES
GLOBULIN PLAS-MCNC: 4 G/DL (ref 2.8–4.4)
GLUCOSE BLD-MCNC: 130 MG/DL (ref 70–99)
HCT VFR BLD AUTO: 48.8 %
HDLC SERPL-MCNC: 36 MG/DL (ref 40–59)
HGB BLD-MCNC: 16.4 G/DL
IMM GRANULOCYTES # BLD AUTO: 0.03 X10(3) UL (ref 0–1)
IMM GRANULOCYTES NFR BLD: 0.4 %
LDLC SERPL CALC-MCNC: 73 MG/DL (ref ?–100)
LYMPHOCYTES # BLD AUTO: 1.7 X10(3) UL (ref 1–4)
LYMPHOCYTES NFR BLD AUTO: 24 %
MCH RBC QN AUTO: 28 PG (ref 26–34)
MCHC RBC AUTO-ENTMCNC: 33.6 G/DL (ref 31–37)
MCV RBC AUTO: 83.4 FL
MICROALBUMIN UR-MCNC: 1.25 MG/DL
MICROALBUMIN/CREAT 24H UR-RTO: 4.5 UG/MG (ref ?–30)
MONOCYTES # BLD AUTO: 0.52 X10(3) UL (ref 0.1–1)
MONOCYTES NFR BLD AUTO: 7.3 %
NEUTROPHILS # BLD AUTO: 4.72 X10 (3) UL (ref 1.5–7.7)
NEUTROPHILS # BLD AUTO: 4.72 X10(3) UL (ref 1.5–7.7)
NEUTROPHILS NFR BLD AUTO: 66.8 %
NONHDLC SERPL-MCNC: 90 MG/DL (ref ?–130)
OSMOLALITY SERPL CALC.SUM OF ELEC: 290 MOSM/KG (ref 275–295)
PLATELET # BLD AUTO: 96 10(3)UL (ref 150–450)
POTASSIUM SERPL-SCNC: 4.5 MMOL/L (ref 3.5–5.1)
PROT SERPL-MCNC: 8 G/DL (ref 6.4–8.2)
RBC # BLD AUTO: 5.85 X10(6)UL
SODIUM SERPL-SCNC: 139 MMOL/L (ref 136–145)
TRIGL SERPL-MCNC: 85 MG/DL (ref 30–149)
TSI SER-ACNC: 1.13 MIU/ML (ref 0.36–3.74)
VLDLC SERPL CALC-MCNC: 13 MG/DL (ref 0–30)
WBC # BLD AUTO: 7.1 X10(3) UL (ref 4–11)

## 2024-01-25 PROCEDURE — 80053 COMPREHEN METABOLIC PANEL: CPT

## 2024-01-25 PROCEDURE — 84443 ASSAY THYROID STIM HORMONE: CPT

## 2024-01-25 PROCEDURE — 82043 UR ALBUMIN QUANTITATIVE: CPT

## 2024-01-25 PROCEDURE — 85025 COMPLETE CBC W/AUTO DIFF WBC: CPT

## 2024-01-25 PROCEDURE — 82570 ASSAY OF URINE CREATININE: CPT

## 2024-01-25 PROCEDURE — 80061 LIPID PANEL: CPT

## 2024-01-25 NOTE — TELEPHONE ENCOUNTER
Order for LOMN/Rx was sent via fax # [phone] to [facility] per Dr Khan's request. Order sent to scan.

## 2024-01-26 ENCOUNTER — MED REC SCAN ONLY (OUTPATIENT)
Dept: FAMILY MEDICINE CLINIC | Facility: CLINIC | Age: 54
End: 2024-01-26

## 2024-02-09 LAB — AMB EXT COLOGUARD RESULT: POSITIVE

## 2024-02-12 ENCOUNTER — TELEPHONE (OUTPATIENT)
Dept: FAMILY MEDICINE CLINIC | Facility: CLINIC | Age: 54
End: 2024-02-12

## 2024-02-12 DIAGNOSIS — R19.5 POSITIVE COLORECTAL CANCER SCREENING USING COLOGUARD TEST: Primary | ICD-10-CM

## 2024-02-12 NOTE — TELEPHONE ENCOUNTER
Notified the patient of the result and need to follow up with surgeon. Patient verbalized understanding. Provided contact information for Dr. Mendez. Answered all questions at this time.

## 2024-02-12 NOTE — TELEPHONE ENCOUNTER
POSITIVE Cologuard test results report dated 2/9/2024 were received via fax from [facility]. Results on Dr Khan's desk pending review.

## 2024-02-19 DIAGNOSIS — E11.9 TYPE 2 DIABETES MELLITUS WITHOUT COMPLICATION, WITHOUT LONG-TERM CURRENT USE OF INSULIN (HCC): ICD-10-CM

## 2024-02-19 RX ORDER — TIRZEPATIDE 2.5 MG/.5ML
2.5 INJECTION, SOLUTION SUBCUTANEOUS WEEKLY
Qty: 2 ML | Refills: 0 | Status: SHIPPED | OUTPATIENT
Start: 2024-02-19

## 2024-03-14 ENCOUNTER — PATIENT MESSAGE (OUTPATIENT)
Dept: FAMILY MEDICINE CLINIC | Facility: CLINIC | Age: 54
End: 2024-03-14

## 2024-03-14 DIAGNOSIS — E11.9 TYPE 2 DIABETES MELLITUS WITHOUT COMPLICATION, WITHOUT LONG-TERM CURRENT USE OF INSULIN (HCC): Primary | ICD-10-CM

## 2024-03-15 NOTE — TELEPHONE ENCOUNTER
[address] 
[phone] Patient: Jimenez Sky MRN: [id number] :1955 You are allergic to the following Allergen Reactions Codeine Itching Demerol (Meperidine) Nausea Only Ivp (Fd And C Blue No.1) Itching Minoxidil Itching Penicillamine Itching Other (comments) Drops blood pressure Percocet (Oxycodone-Acetaminophen) Nausea and Vomiting Recent Documentation Weight BMI OB Status Smoking Status 108.9 kg 37.59 kg/m2 Hysterectomy Never Smoker Emergency Contacts Name Discharge Info Relation Home Work Mobile Chaz,April  Daughter [21] [phone] [id number] [facility] CAREGIVER [3] Spouse [3] [phone] About your hospitalization You were admitted on:  2017 You last received care in the:  [address] [place] [address] ENDOSCOPY You were discharged on:  2017 Unit phone number:  [phone] Why you were hospitalized Your primary diagnosis was:  Not on File Providers Seen During Your Hospitalizations Provider Role Specialty Primary office phone Jemma Brand MD Attending Provider Gastroenterology [phone] Your Primary Care Physician (PCP) Primary Care Physician Office Phone Office Fax Sindhu WEBER [phone] Follow-up Information None Current Discharge Medication List  
  
CONTINUE these medications which have NOT CHANGED Dose & Instructions Dispensing Information Comments Morning Noon Evening Bedtime  
 albuterol 90 mcg/actuation inhaler Commonly known as:  PROVENTIL HFA, VENTOLIN HFA, PROAIR HFA Your last dose was: Your next dose is:    
   
   
 Dose:  1 Puff Take 1 Puff by inhalation as needed. Refills:  0  
     
   
   
   
  
 allopurinol 300 mg tablet From: Randy Eubanks  To: Bryson Khan  Sent: 3/14/2024 7:15 PM CDT  Subject: Mounjaro refill    Good afternoon Dr. Khan I contacted [facility] to refill my next dosage for mounjaro and they still show that the next refill is 2.5 I should be on the 5 mg can you please call this prescription in for me I truly appreciate it. Thank you.   Commonly known as:  Kristina Madsen Your last dose was: Your next dose is:    
   
   
 Dose:  300 mg Take 300 mg by mouth daily. Refills:  0  
     
   
   
   
  
 amLODIPine-benazepril 5-20 mg per capsule Commonly known as:  Almas Matthew Your last dose was: Your next dose is:    
   
   
 Dose:  1 Cap Take 1 Cap by mouth daily. Refills:  0  
     
   
   
   
  
 bumetanide 2 mg tablet Commonly known as:  Hull Hora Your last dose was: Your next dose is:    
   
   
 Dose:  2 mg Take 2 mg by mouth two (2) times a day. Refills:  0  
     
   
   
   
  
 carvedilol 25 mg tablet Commonly known as:  Wendy Awe Your last dose was: Your next dose is:    
   
   
 Dose:  25 mg Take 25 mg by mouth two (2) times daily (with meals). Refills:  0  
     
   
   
   
  
 cholecalciferol (vitamin D3) 2,000 unit Tab Your last dose was: Your next dose is:    
   
   
 Dose:  2000 Units Take 2,000 Units by mouth daily. Refills:  0  
     
   
   
   
  
 colchicine 0.6 mg tablet Your last dose was: Your next dose is:    
   
   
 Dose:  0.6 mg Take 1 Tab by mouth daily. Quantity:  1 Tab Refills:  0  
     
   
   
   
  
 cyanocobalamin 1,000 mcg tablet Your last dose was: Your next dose is:    
   
   
 Dose:  1000 mcg Take 1,000 mcg by mouth daily. Refills:  0  
     
   
   
   
  
 digoxin 0.125 mg tablet Commonly known as:  LANOXIN Your last dose was: Your next dose is:    
   
   
 Dose:  0.125 mg Take 0.125 mg by mouth daily. Refills:  0 DULoxetine 60 mg capsule Commonly known as:  CYMBALTA Your last dose was: Your next dose is:    
   
   
 Dose:  60 mg Take 60 mg by mouth daily. Refills:  0  
     
   
   
   
  
 gabapentin 400 mg capsule Commonly known as:  NEURONTIN Your last dose was: Your next dose is: 400 mg p.o. 3 times daily and 1200 mg p.o. nightly Quantity:  180 Cap Refills:  2  
     
   
   
   
  
 hyoscyamine 0.125 mg tablet Commonly known as:  Amita Garza Your last dose was: Your next dose is:    
   
   
 Dose:  125 mcg Take 125 mcg by mouth every four (4) hours as needed for Cramping. Refills:  0  
     
   
   
   
  
 lidocaine 5 % Commonly known as:  Cosmo Zambrano Your last dose was: Your next dose is:    
   
   
 Dose:  1 Patch 1 Patch by TransDERmal route every twenty-four (24) hours. Apply patch to the affected area for 12 hours a day and remove for 12 hours a day. Refills:  0 LYRICA 150 mg capsule Generic drug:  pregabalin Your last dose was: Your next dose is:    
   
   
 Dose:  150 mg Take 150 mg by mouth two (2) times a day. Refills:  0 Omeprazole delayed release 20 mg tablet Commonly known as:  PRILOSEC D/R Your last dose was: Your next dose is:    
   
   
 Dose:  20 mg Take 20 mg by mouth two (2) times a day. Refills:  0  
     
   
   
   
  
 spironolactone 25 mg tablet Commonly known as:  ALDACTONE Your last dose was: Your next dose is: Take  by mouth two (2) times a day. Refills:  0  
     
   
   
   
  
 temazepam 15 mg capsule Commonly known as:  RESTORIL Your last dose was: Your next dose is:    
   
   
 Dose:  15 mg Take 15 mg by mouth nightly as needed for Sleep. Refills:  0  
     
   
   
   
  
 TRULANCE 3 mg Tab Generic drug:  plecanatide Your last dose was: Your next dose is:    
   
   
 Dose:  3 mg Take 3 mg by mouth. Refills:  0 VOLTAREN 1 % Gel Generic drug:  diclofenac Your last dose was: Your next dose is:    
   
   
 Apply  to affected area as needed. Refills:  0  
     
   
   
   
  
 zolpidem 10 mg tablet Commonly known as:  AMBIEN Your last dose was: Your next dose is:    
   
   
 Dose:  10 mg Take 10 mg by mouth nightly as needed for Sleep. Refills:  0 Discharge Instructions [address] 
[address] [facility] [address] [facility], [address] 
[phone] Moustapha Orozco [id number] 
1955 COLON DISCHARGE INSTRUCTIONS DISCOMFORT: 
Redness at IV site- apply warm compress to area; if redness or soreness persist- contact your physician There may be a slight amount of blood passed from the rectum Gaseous discomfort- walking, belching will help relieve any discomfort You may not operate a vehicle for 12 hours You may not  engage in an occupation involving machinery or appliances for rest of today You may not  drink alcoholic beverages for at least 12 hours Avoid making any critical decisions for at least 24 hour DIET: 
 High fiber diet.  however -  remember your colon is empty and a heavy meal will produce gas. Avoid these foods:  vegetables, fried / greasy foods, carbonated drinks for today ACTIVITY: It is recommended that you spend the remainder of the day resting -  avoid any strenuous activity. CALL M.D. ANY SIGN OF: Increasing pain, nausea, vomiting Abdominal distension (swelling) New increased bleeding (oral or rectal) Fever (chills) Pain in chest area Bloody discharge from nose or mouth Shortness of breath Post procedure diagnosis:  Normal EUS Follow-up Instructions: 
 
Call Dr. Meir Calderon for any questions or problems. If we took a biopsy please call the office within 2 weeks to discuss your                             pathology results. Telephone # [phone] Discharge Orders None Introducing [facility]! Dear Wesly Milligan: 
Thank you for requesting a [employer] account.   Our records indicate that you already have an active [facility] account. You can access your account anytime at [email or web address] Did you know that you can access your hospital and ER discharge instructions at any time in [facility]? You can also review all of your test results from your hospital stay or ER visit. Additional Information If you have questions, please visit the Frequently Asked Questions section of the [employer] website at [email or web address] Remember, [facility] is NOT to be used for urgent needs. For medical emergencies, dial 911. Now available from your iPhone and Android! General Information Please provide this summary of care documentation to your next provider. Patient Signature:  ____________________________________________________________ Date:  ____________________________________________________________  
  
[facility] Provider Signature:  ____________________________________________________________ Date:  ____________________________________________________________

## 2024-03-26 RX ORDER — TIRZEPATIDE 5 MG/.5ML
5 INJECTION, SOLUTION SUBCUTANEOUS WEEKLY
Qty: 2 ML | Refills: 0 | Status: SHIPPED | OUTPATIENT
Start: 2024-03-26

## 2024-03-30 DIAGNOSIS — I10 HYPERTENSION, UNSPECIFIED TYPE: ICD-10-CM

## 2024-04-01 ENCOUNTER — TELEPHONE (OUTPATIENT)
Dept: FAMILY MEDICINE CLINIC | Facility: CLINIC | Age: 54
End: 2024-04-01

## 2024-04-01 RX ORDER — TAMSULOSIN HYDROCHLORIDE 0.4 MG/1
0.4 CAPSULE ORAL DAILY
Qty: 90 CAPSULE | Refills: 1 | Status: SHIPPED | OUTPATIENT
Start: 2024-04-01

## 2024-04-01 NOTE — TELEPHONE ENCOUNTER
Diabetic eye exam report dated 3/28/2024 was received via fax [facility]. Report placed in Dr Khan's in-box pending review. HM updated.

## 2024-04-10 ENCOUNTER — MED REC SCAN ONLY (OUTPATIENT)
Dept: FAMILY MEDICINE CLINIC | Facility: CLINIC | Age: 54
End: 2024-04-10

## 2024-04-24 ENCOUNTER — OFFICE VISIT (OUTPATIENT)
Dept: FAMILY MEDICINE CLINIC | Facility: CLINIC | Age: 54
End: 2024-04-24
Payer: COMMERCIAL

## 2024-04-24 VITALS
OXYGEN SATURATION: 97 % | HEIGHT: 72 IN | HEART RATE: 87 BPM | DIASTOLIC BLOOD PRESSURE: 70 MMHG | BODY MASS INDEX: 41.72 KG/M2 | RESPIRATION RATE: 18 BRPM | WEIGHT: 308 LBS | SYSTOLIC BLOOD PRESSURE: 118 MMHG

## 2024-04-24 DIAGNOSIS — I10 HYPERTENSION, UNSPECIFIED TYPE: ICD-10-CM

## 2024-04-24 DIAGNOSIS — R19.5 POSITIVE COLORECTAL CANCER SCREENING USING COLOGUARD TEST: ICD-10-CM

## 2024-04-24 DIAGNOSIS — J31.0 RHINITIS, UNSPECIFIED TYPE: ICD-10-CM

## 2024-04-24 DIAGNOSIS — Z23 NEED FOR SHINGLES VACCINE: ICD-10-CM

## 2024-04-24 DIAGNOSIS — E11.9 TYPE 2 DIABETES MELLITUS WITHOUT COMPLICATION, WITHOUT LONG-TERM CURRENT USE OF INSULIN (HCC): Primary | ICD-10-CM

## 2024-04-24 DIAGNOSIS — E78.2 MIXED HYPERLIPIDEMIA: ICD-10-CM

## 2024-04-24 LAB — HEMOGLOBIN A1C: 5.8 % (ref 4.3–5.6)

## 2024-04-24 PROCEDURE — 99214 OFFICE O/P EST MOD 30 MIN: CPT | Performed by: FAMILY MEDICINE

## 2024-04-24 PROCEDURE — 90750 HZV VACC RECOMBINANT IM: CPT | Performed by: FAMILY MEDICINE

## 2024-04-24 PROCEDURE — 83036 HEMOGLOBIN GLYCOSYLATED A1C: CPT | Performed by: FAMILY MEDICINE

## 2024-04-24 PROCEDURE — 90471 IMMUNIZATION ADMIN: CPT | Performed by: FAMILY MEDICINE

## 2024-04-24 PROCEDURE — 3078F DIAST BP <80 MM HG: CPT | Performed by: FAMILY MEDICINE

## 2024-04-24 PROCEDURE — 3044F HG A1C LEVEL LT 7.0%: CPT | Performed by: FAMILY MEDICINE

## 2024-04-24 PROCEDURE — 3061F NEG MICROALBUMINURIA REV: CPT | Performed by: FAMILY MEDICINE

## 2024-04-24 PROCEDURE — 3008F BODY MASS INDEX DOCD: CPT | Performed by: FAMILY MEDICINE

## 2024-04-24 PROCEDURE — 3074F SYST BP LT 130 MM HG: CPT | Performed by: FAMILY MEDICINE

## 2024-04-24 RX ORDER — ATORVASTATIN CALCIUM 10 MG/1
10 TABLET, FILM COATED ORAL DAILY
Qty: 90 TABLET | Refills: 1 | Status: SHIPPED | OUTPATIENT
Start: 2024-04-24

## 2024-04-24 RX ORDER — LISINOPRIL AND HYDROCHLOROTHIAZIDE 25; 20 MG/1; MG/1
1 TABLET ORAL DAILY
Qty: 90 TABLET | Refills: 1 | Status: SHIPPED | OUTPATIENT
Start: 2024-04-24

## 2024-04-24 RX ORDER — TIRZEPATIDE 5 MG/.5ML
5 INJECTION, SOLUTION SUBCUTANEOUS WEEKLY
Qty: 2 ML | Refills: 2 | Status: SHIPPED | OUTPATIENT
Start: 2024-04-24

## 2024-04-24 NOTE — PROGRESS NOTES
[facility] Family Medicine Office Note  Chief Complaint:   Chief Complaint   Patient presents with    Follow - Up    Diabetes       HPI:   This is a 53 year old male coming in for follow up.     T2DM - has been complian tiwth mounjaro, metformin. A1c today 5.8%, previously 7.4% in 01/24. Tolerating medications well w/o SE.     HTN - compliant with lisinopril-hydrochlorothiazide.     Rhinitis - reports congestion, b/l ear fullness. Has used afrin which did give some relief.     History reviewed. No pertinent past medical history.  Past Surgical History:   Procedure Laterality Date    Other surgical history  2009    gastric lap band      Social History:  Social History     Socioeconomic History    Marital status:    Tobacco Use    Smoking status: Former   Vaping Use    Vaping status: Never Used   Substance and Sexual Activity    Alcohol use: Yes     Comment: occ.    Drug use: No     Family History:  Family History   Problem Relation Age of Onset    Heart Attack Father     Heart Surgery Father     Diabetes Mother      Allergies:  No Known Allergies  Current Meds:  Current Outpatient Medications   Medication Sig Dispense Refill    metFORMIN 500 MG Oral Tab Take 1 tablet (500 mg total) by mouth daily with breakfast. 90 tablet 1    lisinopril-hydroCHLOROthiazide 20-25 MG Oral Tab Take 1 tablet by mouth daily. 90 tablet 1    atorvastatin 10 MG Oral Tab Take 1 tablet (10 mg total) by mouth daily. 90 tablet 1    Tirzepatide (MOUNJARO) 5 MG/0.5ML Subcutaneous Solution Pen-injector Inject 5 mg into the skin once a week. 2 mL 2    TAMSULOSIN 0.4 MG Oral Cap TAKE 1 CAPSULE BY MOUTH EVERY DAY 90 capsule 1    Vardenafil HCl 20 MG Oral Tab Take 1 tablet (20 mg total) by mouth daily as needed for Erectile Dysfunction.      Glucose Blood (ONETOUCH ULTRA) In Vitro Strip Check blood sugar once daily 100 each 2      Counseling given: Not Answered       REVIEW OF SYSTEMS:   Review of Systems   Constitutional: Negative.     HENT:          See HPI   Eyes: Negative.    Respiratory: Negative.     Cardiovascular: Negative.    Gastrointestinal: Negative.    Endocrine: Negative.    Genitourinary: Negative.    Musculoskeletal: Negative.    Skin: Negative.    Neurological: Negative.    Psychiatric/Behavioral: Negative.          EXAM:   /70   Pulse 87   Resp 18   Ht 6' (1.829 m)   Wt (!) 308 lb (139.7 kg)   SpO2 97%   BMI 41.77 kg/m²  Estimated body mass index is 41.77 kg/m² as calculated from the following:    Height as of this encounter: 6' (1.829 m).    Weight as of this encounter: 308 lb (139.7 kg).   Vital signs reviewed.Appears stated age, well groomed.  Physical Exam  Vitals and nursing note reviewed.   Constitutional:       Appearance: Normal appearance.   HENT:      Head: Normocephalic and atraumatic.      Nose: Congestion and rhinorrhea present.      Comments: B/l nasal turbinate hypertrophy  Eyes:      Pupils: Pupils are equal, round, and reactive to light.   Cardiovascular:      Rate and Rhythm: Normal rate and regular rhythm.      Pulses: Normal pulses.      Heart sounds: Normal heart sounds. No murmur heard.  Pulmonary:      Effort: Pulmonary effort is normal. No respiratory distress.      Breath sounds: Normal breath sounds. No wheezing or rhonchi.   Abdominal:      General: Abdomen is flat. Bowel sounds are normal. There is no distension.      Palpations: Abdomen is soft. There is no mass.      Tenderness: There is no abdominal tenderness.      Hernia: No hernia is present.   Musculoskeletal:      Cervical back: Normal range of motion.      Right lower leg: No edema.      Left lower leg: No edema.   Skin:     Findings: No rash.   Neurological:      General: No focal deficit present.      Mental Status: He is alert and oriented to person, place, and time.          ASSESSMENT AND PLAN:   1. Type 2 diabetes mellitus without complication, without long-term current use of insulin (HCC)  Significantly improved. CPM.   DM  tasks UTD.  F/u 6mo.     - HEMOGLOBIN A1C  - metFORMIN 500 MG Oral Tab; Take 1 tablet (500 mg total) by mouth daily with breakfast.  Dispense: 90 tablet; Refill: 1  - Tirzepatide (MOUNJARO) 5 MG/0.5ML Subcutaneous Solution Pen-injector; Inject 5 mg into the skin once a week.  Dispense: 2 mL; Refill: 2    2. Hypertension, unspecified type  Stable, CPM   - lisinopril-hydroCHLOROthiazide 20-25 MG Oral Tab; Take 1 tablet by mouth daily.  Dispense: 90 tablet; Refill: 1    3. Need for shingles vaccine  - ZOSTER VACC RECOMBINANT IM NJX    4. Mixed hyperlipidemia  Stable, CPM   - atorvastatin 10 MG Oral Tab; Take 1 tablet (10 mg total) by mouth daily.  Dispense: 90 tablet; Refill: 1    5. Positive colorectal cancer screening using Cologuard test  Positive cologuard, needs colonoscopy - new surgery referral placed.   - Surgery Referral - In Network    6. Rhinitis, unspecified type  Start flonase, nasal saline spray. Can use antihistamine/decongestant prn. F/u if no improvement, consider ENT referral.       Meds & Refills for this Visit:  Requested Prescriptions     Signed Prescriptions Disp Refills    metFORMIN 500 MG Oral Tab 90 tablet 1     Sig: Take 1 tablet (500 mg total) by mouth daily with breakfast.    lisinopril-hydroCHLOROthiazide 20-25 MG Oral Tab 90 tablet 1     Sig: Take 1 tablet by mouth daily.    atorvastatin 10 MG Oral Tab 90 tablet 1     Sig: Take 1 tablet (10 mg total) by mouth daily.    Tirzepatide (MOUNJARO) 5 MG/0.5ML Subcutaneous Solution Pen-injector 2 mL 2     Sig: Inject 5 mg into the skin once a week.       Health Maintenance:  Health Maintenance Due   Topic Date Due    COVID-19 Vaccine (4 - 2023-24 season) 09/01/2023    Zoster Vaccines (2 of 2) 03/20/2024       Patient/Caregiver Education: Patient/Caregiver Education: There are no barriers to learning. Medical education done.   Outcome: Patient verbalizes understanding. Patient is notified to call with any questions, complications, allergies, or  worsening or changing symptoms.  Patient is to call with any side effects or complications from the treatments as a result of today.     Problem List:  Patient Active Problem List   Diagnosis    Pre-operative cardiovascular examination    Abnormal EKG    Type 2 diabetes mellitus without complication, without long-term current use of insulin (HCC)    Thrombocytopenia (HCC)    Mixed hyperlipidemia    Essential hypertension    Benign prostatic hyperplasia    Primary obstructive sleep apnea of

## 2024-05-17 DIAGNOSIS — E11.9 TYPE 2 DIABETES MELLITUS WITHOUT COMPLICATION, WITHOUT LONG-TERM CURRENT USE OF INSULIN (HCC): ICD-10-CM

## 2024-05-20 RX ORDER — TIRZEPATIDE 5 MG/.5ML
5 INJECTION, SOLUTION SUBCUTANEOUS WEEKLY
Qty: 2 ML | Refills: 2 | Status: SHIPPED | OUTPATIENT
Start: 2024-05-20

## 2024-06-17 ENCOUNTER — PATIENT MESSAGE (OUTPATIENT)
Dept: FAMILY MEDICINE CLINIC | Facility: CLINIC | Age: 54
End: 2024-06-17

## 2024-06-17 DIAGNOSIS — E11.9 TYPE 2 DIABETES MELLITUS WITHOUT COMPLICATION, WITHOUT LONG-TERM CURRENT USE OF INSULIN (HCC): Primary | ICD-10-CM

## 2024-06-18 NOTE — TELEPHONE ENCOUNTER
From: Randy Eubanks  To: Bryson Khan  Sent: 6/17/2024 6:29 PM CDT  Subject: Monjauro increase    Hi Dr. Khan, I was wondering if you can take a look at my most recent request for a refill. Was wondering if you would consider if the 7 mg for my medication is still I’m back log if you would please call in a prescription for the 10 mg. My wife’s doctor did the same for her since the seven is always out of stock. Doesn’t seem to be doing anything to decrease my appetite.

## 2024-06-18 NOTE — TELEPHONE ENCOUNTER
Patient is asking for a refill of monjauro, they are asking for an increase to the 10mg because the 7mg is on backorder. LOV 4/24. Monjaruro 5mg sent on 5/20. Please advise

## 2024-06-19 RX ORDER — TIRZEPATIDE 10 MG/.5ML
10 INJECTION, SOLUTION SUBCUTANEOUS WEEKLY
Qty: 2 ML | Refills: 0 | Status: SHIPPED | OUTPATIENT
Start: 2024-06-19 | End: 2024-07-11

## 2024-06-19 NOTE — TELEPHONE ENCOUNTER
Called patient and clarified that he is out of the 5mg of mounjaro. Order placed for 10g of mounjaro.

## 2024-07-16 DIAGNOSIS — I10 HYPERTENSION, UNSPECIFIED TYPE: ICD-10-CM

## 2024-07-16 RX ORDER — TAMSULOSIN HYDROCHLORIDE 0.4 MG/1
0.4 CAPSULE ORAL DAILY
Qty: 90 CAPSULE | Refills: 1 | OUTPATIENT
Start: 2024-07-16

## 2024-07-20 NOTE — TELEPHONE ENCOUNTER
Medication(s) to Refill:   Requested Prescriptions     Pending Prescriptions Disp Refills    Tirzepatide (MOUNJARO) 10 MG/0.5ML Subcutaneous Solution Pen-injector [Pharmacy Med Name: MOUNJARO 10 MG/0.5 ML PEN] 2 mL 0     Sig: INJECT 10 MG INTO THE SKIN ONCE A WEEK FOR 4 DOSES.         Reason for Medication Refill being sent to Provider / Reason Protocol Failed:  [] 90 day refill has already been granted  [] Blood Pressure out of range  [] Labs Abnormal/over due  [] Medication not previously prescribed by Provider  [] Non-Protocol Medication  [] Controlled Substance   [] Due for appointment- no future appointment scheduled  [] No Follow up specified      Last Time Medication was Filled:  6/19/24      Last Office Visit with PCP: 4/24/24    When Patient was Due Back to the Office:  6 months  (from when PCP last addressed condition)    Future Appointments:  Future Appointments   Date Time Provider Department Center   10/2/2024  6:20 PM Bryson Khan MD [facility] [place]         Last Blood Pressures:  BP Readings from Last 2 Encounters:   04/24/24 118/70   01/24/24 118/78       Action taken:  [] Refill approved per protocol  [] Routing to provider for approval

## 2024-08-26 DIAGNOSIS — I10 HYPERTENSION, UNSPECIFIED TYPE: ICD-10-CM

## 2024-08-27 RX ORDER — TAMSULOSIN HYDROCHLORIDE 0.4 MG/1
0.4 CAPSULE ORAL DAILY
Qty: 90 CAPSULE | Refills: 0 | Status: SHIPPED | OUTPATIENT
Start: 2024-08-27

## 2024-09-27 PROBLEM — M54.50 LOW BACK PAIN: Status: ACTIVE | Noted: 2024-09-27

## 2024-09-27 PROBLEM — Z98.84 LAP-BAND SURGERY STATUS: Status: ACTIVE | Noted: 2024-09-27

## 2024-09-27 PROBLEM — E11.622 SKIN ULCER DUE TO DIABETES MELLITUS (HCC): Status: ACTIVE | Noted: 2024-09-27

## 2024-09-27 PROBLEM — L98.499 SKIN ULCER DUE TO DIABETES MELLITUS (HCC): Status: ACTIVE | Noted: 2024-09-27

## 2024-09-27 PROBLEM — T17.928A ASPIRATION OF FOOD: Status: RESOLVED | Noted: 2017-03-02 | Resolved: 2024-09-27
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
## 2024-09-27 PROBLEM — I36.9 NONRHEUMATIC TRICUSPID VALVE DISORDER: Status: ACTIVE | Noted: 2024-09-27

## 2024-09-27 PROBLEM — K44.9 DIAPHRAGMATIC HERNIA: Status: ACTIVE | Noted: 2024-09-27

## 2024-09-27 PROBLEM — E66.01 MORBID OBESITY (HCC): Status: ACTIVE | Noted: 2024-09-27

## 2024-09-27 PROBLEM — R11.10 VOMITING: Status: RESOLVED | Noted: 2024-09-27 | Resolved: 2024-09-27

## 2024-09-27 PROBLEM — F10.11 HISTORY OF ALCOHOL ABUSE: Status: ACTIVE | Noted: 2020-04-25

## 2024-09-27 PROBLEM — E11.42 TYPE 2 DIABETES MELLITUS WITH PERIPHERAL NEUROPATHY (HCC): Status: ACTIVE | Noted: 2020-04-25

## 2024-09-27 PROBLEM — M17.9 OSTEOARTHRITIS OF KNEE: Status: ACTIVE | Noted: 2024-09-27

## 2024-09-27 PROBLEM — M19.91 LOCALIZED, PRIMARY OSTEOARTHRITIS: Status: ACTIVE | Noted: 2024-09-27

## 2024-09-27 PROBLEM — K70.0 ALCOHOLIC FATTY LIVER: Status: ACTIVE | Noted: 2018-01-18

## 2024-09-27 RX ORDER — TIRZEPATIDE 5 MG/.5ML
5 INJECTION, SOLUTION SUBCUTANEOUS WEEKLY
COMMUNITY
Start: 2024-07-13 | End: 2024-10-02

## 2024-10-02 ENCOUNTER — OFFICE VISIT (OUTPATIENT)
Dept: FAMILY MEDICINE CLINIC | Facility: CLINIC | Age: 54
End: 2024-10-02
Payer: COMMERCIAL

## 2024-10-02 VITALS
HEART RATE: 84 BPM | HEIGHT: 72 IN | SYSTOLIC BLOOD PRESSURE: 124 MMHG | WEIGHT: 292 LBS | DIASTOLIC BLOOD PRESSURE: 70 MMHG | BODY MASS INDEX: 39.55 KG/M2 | OXYGEN SATURATION: 95 % | RESPIRATION RATE: 18 BRPM

## 2024-10-02 DIAGNOSIS — R19.5 POSITIVE COLORECTAL CANCER SCREENING USING COLOGUARD TEST: ICD-10-CM

## 2024-10-02 DIAGNOSIS — N52.9 ERECTILE DYSFUNCTION, UNSPECIFIED ERECTILE DYSFUNCTION TYPE: ICD-10-CM

## 2024-10-02 DIAGNOSIS — I10 ESSENTIAL HYPERTENSION: ICD-10-CM

## 2024-10-02 DIAGNOSIS — E11.9 TYPE 2 DIABETES MELLITUS WITHOUT COMPLICATION, WITHOUT LONG-TERM CURRENT USE OF INSULIN (HCC): Primary | ICD-10-CM

## 2024-10-02 PROCEDURE — 99214 OFFICE O/P EST MOD 30 MIN: CPT | Performed by: FAMILY MEDICINE

## 2024-10-02 RX ORDER — TADALAFIL 20 MG/1
20 TABLET ORAL
Qty: 12 TABLET | Refills: 3 | Status: SHIPPED | OUTPATIENT
Start: 2024-10-02

## 2024-10-02 RX ORDER — TIRZEPATIDE 12.5 MG/.5ML
12.5 INJECTION, SOLUTION SUBCUTANEOUS WEEKLY
Qty: 2 ML | Refills: 2 | Status: SHIPPED | OUTPATIENT
Start: 2024-10-02 | End: 2024-10-24

## 2024-10-02 NOTE — PROGRESS NOTES
[facility] Family Medicine Office Note  Chief Complaint:   Chief Complaint   Patient presents with    Diabetes    Hypertension       HPI:   This is a 54 year old male coming in for follow up    T2DM - Last A1c value was 5.8% done 2024.  Compliant with mounjaro, metformin. No side effects. No hypoglycemic episodes. Denies any other new concerns.     HTN - stable on medications. No symptoms. No new concerns.     ED - wants to go back on cialis. Has been on vardenafil. He has followed with urology but needs new referral.     Positive cologuard - has not followed up with surgery yet.     Past Medical History:    Aspiration of food    Diabetes (HCC)    Obesity    Sleep apnea    Vomiting     Past Surgical History:   Procedure Laterality Date    Other surgical history      gastric lap band     Vasectomy       Social History:  Social History     Socioeconomic History    Marital status:    Tobacco Use    Smoking status: Former     Current packs/day: 0.00     Types: Cigarettes     Quit date: 2006     Years since quittin.7   Vaping Use    Vaping status: Never Used   Substance and Sexual Activity    Alcohol use: Yes     Comment: occ.    Drug use: Never   Other Topics Concern    Caffeine Concern No    Weight Concern Yes     Family History:  Family History   Problem Relation Age of Onset    Heart Attack Father     Heart Surgery Father     Diabetes Mother      Allergies:  No Known Allergies  Current Meds:  Current Outpatient Medications   Medication Sig Dispense Refill    Tirzepatide (MOUNJARO) 12.5 MG/0.5ML Subcutaneous Solution Pen-injector Inject 12.5 mg into the skin once a week for 4 doses. 2 mL 2    Tadalafil 20 MG Oral Tab Take 1 tablet (20 mg total) by mouth daily as needed for Erectile Dysfunction. 12 tablet 3    tamsulosin 0.4 MG Oral Cap TAKE 1 CAPSULE BY MOUTH EVERY DAY 90 capsule 0    metFORMIN 500 MG Oral Tab Take 1 tablet (500 mg total) by mouth daily with breakfast. 90 tablet 1     lisinopril-hydroCHLOROthiazide 20-25 MG Oral Tab Take 1 tablet by mouth daily. 90 tablet 1    atorvastatin 10 MG Oral Tab Take 1 tablet (10 mg total) by mouth daily. 90 tablet 1    Glucose Blood (ONETOUCH ULTRA) In Vitro Strip Check blood sugar once daily 100 each 2      Counseling given: Not Answered       REVIEW OF SYSTEMS:   Review of Systems   Constitutional: Negative.    HENT: Negative.     Eyes: Negative.    Respiratory: Negative.     Cardiovascular: Negative.    Gastrointestinal: Negative.    Endocrine: Negative.    Genitourinary: Negative.    Musculoskeletal: Negative.    Skin: Negative.    Neurological: Negative.    Psychiatric/Behavioral: Negative.          EXAM:   /70   Pulse 84   Resp 18   Ht 6' (1.829 m)   Wt 292 lb (132.5 kg)   SpO2 95%   BMI 39.60 kg/m²  Estimated body mass index is 39.6 kg/m² as calculated from the following:    Height as of this encounter: 6' (1.829 m).    Weight as of this encounter: 292 lb (132.5 kg).   Vital signs reviewed.Appears stated age, well groomed.  Physical Exam  Vitals and nursing note reviewed.   Constitutional:       Appearance: Normal appearance.   HENT:      Head: Normocephalic and atraumatic.   Eyes:      Pupils: Pupils are equal, round, and reactive to light.   Cardiovascular:      Rate and Rhythm: Normal rate and regular rhythm.      Pulses: Normal pulses.      Heart sounds: Normal heart sounds. No murmur heard.  Pulmonary:      Effort: Pulmonary effort is normal. No respiratory distress.      Breath sounds: Normal breath sounds. No wheezing or rhonchi.   Abdominal:      General: Abdomen is flat. Bowel sounds are normal. There is no distension.      Palpations: Abdomen is soft. There is no mass.      Tenderness: There is no abdominal tenderness.      Hernia: No hernia is present.   Musculoskeletal:      Right lower leg: No edema.      Left lower leg: No edema.   Skin:     Findings: No rash.   Neurological:      General: No focal deficit present.       Mental Status: He is alert and oriented to person, place, and time.          ASSESSMENT AND PLAN:   1. Type 2 diabetes mellitus without complication, without long-term current use of insulin (HCC)  Well controlled. Will adjust mounjaro for added wt loss benefits.   Goal: A1c < 7   Medications: mounjaro, metformin.   Diet & exercise: diabetic diet, regular exericse.   Daily accucheks are appropriate  Hypoglycemic episodes?: none   Labs: UTD  CV: continue statin   Renal: UTD  Eye: UTD  Foot: UTD  Immunizations: UTD. Declines flu.    RTC in 6mo for T2DM follow up.     - Tirzepatide (MOUNJARO) 12.5 MG/0.5ML Subcutaneous Solution Pen-injector; Inject 12.5 mg into the skin once a week for 4 doses.  Dispense: 2 mL; Refill: 2  - Hemoglobin A1C [E]; Future  - Comp Metabolic Panel (14) [E]; Future  - Lipid Panel; Future    2. Essential hypertension  Stable, CPM.     3. Erectile dysfunction, unspecified erectile dysfunction type  Will trial cialis. If no improvement follow up w urology  - Tadalafil 20 MG Oral Tab; Take 1 tablet (20 mg total) by mouth daily as needed for Erectile Dysfunction.  Dispense: 12 tablet; Refill: 3  - Urology Referral - In Network    4. Positive colorectal cancer screening using Cologuard test  F/u with surgery for colonoscopy.       Meds & Refills for this Visit:  Requested Prescriptions     Signed Prescriptions Disp Refills    Tirzepatide (MOUNJARO) 12.5 MG/0.5ML Subcutaneous Solution Pen-injector 2 mL 2     Sig: Inject 12.5 mg into the skin once a week for 4 doses.    Tadalafil 20 MG Oral Tab 12 tablet 3     Sig: Take 1 tablet (20 mg total) by mouth daily as needed for Erectile Dysfunction.       Health Maintenance:  Health Maintenance Due   Topic Date Due    COVID-19 Vaccine (4 - 2023-24 season) 09/01/2024    Influenza Vaccine (1) 10/01/2024    Diabetes Care A1C  10/24/2024       Patient/Caregiver Education: Patient/Caregiver Education: There are no barriers to learning. Medical education done.    Outcome: Patient verbalizes understanding. Patient is notified to call with any questions, complications, allergies, or worsening or changing symptoms.  Patient is to call with any side effects or complications from the treatments as a result of today.     Problem List:  Patient Active Problem List   Diagnosis    Pre-operative cardiovascular examination    Abnormal EKG    Type 2 diabetes mellitus without complication, without long-term current use of insulin (HCC)    Thrombocytopenia (HCC)    Mixed hyperlipidemia    Essential hypertension    Benign prostatic hyperplasia    Primary obstructive sleep apnea of     LAP-BAND surgery status    History of alcohol abuse    Diaphragmatic hernia    Alcoholic fatty liver    Localized, primary osteoarthritis    Low back pain    Morbid obesity (HCC)    Nonrheumatic tricuspid valve disorder    Osteoarthritis of knee    Skin ulcer due to diabetes mellitus (HCC)    Type 2 diabetes mellitus with peripheral neuropathy (HCC)

## 2025-01-12 DIAGNOSIS — E11.9 TYPE 2 DIABETES MELLITUS WITHOUT COMPLICATION, WITHOUT LONG-TERM CURRENT USE OF INSULIN (HCC): ICD-10-CM

## 2025-01-12 DIAGNOSIS — E78.2 MIXED HYPERLIPIDEMIA: ICD-10-CM

## 2025-01-12 DIAGNOSIS — I10 HYPERTENSION, UNSPECIFIED TYPE: ICD-10-CM

## 2025-01-13 RX ORDER — LISINOPRIL AND HYDROCHLOROTHIAZIDE 20; 25 MG/1; MG/1
1 TABLET ORAL DAILY
Qty: 90 TABLET | Refills: 1 | Status: SHIPPED | OUTPATIENT
Start: 2025-01-13

## 2025-01-13 RX ORDER — ATORVASTATIN CALCIUM 10 MG/1
10 TABLET, FILM COATED ORAL DAILY
Qty: 90 TABLET | Refills: 1 | Status: SHIPPED | OUTPATIENT
Start: 2025-01-13

## 2025-01-13 RX ORDER — TAMSULOSIN HYDROCHLORIDE 0.4 MG/1
0.4 CAPSULE ORAL DAILY
Qty: 90 CAPSULE | Refills: 1 | Status: SHIPPED | OUTPATIENT
Start: 2025-01-13

## 2025-04-04 ENCOUNTER — TELEPHONE (OUTPATIENT)
Dept: FAMILY MEDICINE CLINIC | Facility: CLINIC | Age: 55
End: 2025-04-04

## 2025-04-18 DIAGNOSIS — E11.9 TYPE 2 DIABETES MELLITUS WITHOUT COMPLICATION, WITHOUT LONG-TERM CURRENT USE OF INSULIN (HCC): ICD-10-CM

## 2025-04-23 RX ORDER — TIRZEPATIDE 12.5 MG/.5ML
12.5 INJECTION, SOLUTION SUBCUTANEOUS
Qty: 2 ML | Refills: 2 | Status: SHIPPED | OUTPATIENT
Start: 2025-04-23

## 2025-04-30 ENCOUNTER — OFFICE VISIT (OUTPATIENT)
Dept: FAMILY MEDICINE CLINIC | Facility: CLINIC | Age: 55
End: 2025-04-30
Payer: COMMERCIAL

## 2025-04-30 VITALS
RESPIRATION RATE: 18 BRPM | BODY MASS INDEX: 40.09 KG/M2 | HEIGHT: 72 IN | OXYGEN SATURATION: 98 % | WEIGHT: 296 LBS | SYSTOLIC BLOOD PRESSURE: 126 MMHG | DIASTOLIC BLOOD PRESSURE: 74 MMHG | HEART RATE: 79 BPM

## 2025-04-30 DIAGNOSIS — Z12.5 SCREENING FOR PROSTATE CANCER: ICD-10-CM

## 2025-04-30 DIAGNOSIS — Z00.00 LABORATORY EXAM ORDERED AS PART OF ROUTINE GENERAL MEDICAL EXAMINATION: ICD-10-CM

## 2025-04-30 DIAGNOSIS — I10 ESSENTIAL HYPERTENSION: ICD-10-CM

## 2025-04-30 DIAGNOSIS — B35.3 TINEA PEDIS OF BOTH FEET: ICD-10-CM

## 2025-04-30 DIAGNOSIS — E11.9 TYPE 2 DIABETES MELLITUS WITHOUT COMPLICATION, WITHOUT LONG-TERM CURRENT USE OF INSULIN (HCC): Primary | ICD-10-CM

## 2025-04-30 DIAGNOSIS — Z00.00 LABORATORY EXAMINATION ORDERED AS PART OF A ROUTINE GENERAL MEDICAL EXAMINATION: ICD-10-CM

## 2025-04-30 DIAGNOSIS — R19.5 POSITIVE COLORECTAL CANCER SCREENING USING COLOGUARD TEST: ICD-10-CM

## 2025-04-30 DIAGNOSIS — E78.2 MIXED HYPERLIPIDEMIA: ICD-10-CM

## 2025-04-30 PROCEDURE — 99214 OFFICE O/P EST MOD 30 MIN: CPT | Performed by: FAMILY MEDICINE

## 2025-04-30 RX ORDER — TIRZEPATIDE 15 MG/.5ML
15 INJECTION, SOLUTION SUBCUTANEOUS WEEKLY
Qty: 2 ML | Refills: 5 | Status: SHIPPED | OUTPATIENT
Start: 2025-04-30

## 2025-04-30 RX ORDER — CLOTRIMAZOLE 1 %
1 CREAM (GRAM) TOPICAL 2 TIMES DAILY
Qty: 28 G | Refills: 0 | Status: SHIPPED | OUTPATIENT
Start: 2025-04-30 | End: 2025-05-14

## 2025-04-30 NOTE — PROGRESS NOTES
Subjective:   Randy Eubanks is a 54 year old male who presents for Diabetes (Discuss increasing Mounjaro ) and Hypertension     History/Other:   History of Present Illness  Randy Eubanks is a 54 year old male with diabetes who presents for follow-up on weight management and diabetes control.    He has been using Mounjaro for weight management, which helps control his cravings for sweets, but he has not experienced significant weight loss, having lost approximately twelve pounds. He wants to increase the dosage to fifteen milligrams. His work is physically demanding, and he aims to increase his exercise intensity by an additional half-hour of cardio daily.     He has not experienced any new symptoms related to his diabetes. He is due for routine blood work. Last A1c value was 5.8% done 4/24/2024.    He has not yet completed a colonoscopy despite having positive cologuard test, which he attributes to scheduling issues and cancellations. He has undergone a consultation but needs to reschedule the procedure.    He reports occasional tingling in his toes but no significant back pain or sciatica-like symptoms. He recalls experiencing a sensation similar to 'needles' in the past, which resolved on its own.     Chief Complaint Reviewed and Verified  Nursing Notes Reviewed and   Verified  Tobacco Reviewed  Allergies Reviewed  Medications Reviewed    Medical History Reviewed  Surgical History Reviewed  Family History   Reviewed  Social History Reviewed         Current Medications[1]    PHQ-2 SCORE: 0  , done 4/30/2025          Review of Systems:  Pertinent items are noted in HPI.      Objective:   /74   Pulse 79   Resp 18   Ht 6' (1.829 m)   Wt 296 lb (134.3 kg)   SpO2 98%   BMI 40.14 kg/m²  Estimated body mass index is 40.14 kg/m² as calculated from the following:    Height as of this encounter: 6' (1.829 m).    Weight as of this encounter: 296 lb (134.3 kg).  Results         Physical  Exam  GENERAL: Alert, cooperative, well developed, no acute distress.  HEENT: Normocephalic, normal oropharynx, moist mucous membranes.  CHEST: Clear to auscultation bilaterally, no wheezes, rhonchi, or crackles.  CARDIOVASCULAR: Normal heart rate and rhythm, S1 and S2 normal without murmurs.  ABDOMEN: Soft, non-tender, non-distended, without organomegaly, normal bowel sounds.  EXTREMITIES: No cyanosis or edema.  NEUROLOGICAL: Cranial nerves grossly intact, moves all extremities without gross motor or sensory deficit, sensation intact in feet.  SKIN: intertrigo/tinea pedis visualized at left foot interdigit space.      Bilateral monofilament/sensation of both feet is normal.  Pulsation pedal pulse exam of both lower legs/feet is normal as well.        Assessment & Plan:   1. Type 2 diabetes mellitus without complication, without long-term current use of insulin (HCC) (Primary)  Stable, due for labs. Will adjust mounjaro for added weight loss benefit.     Goal: A1c < 7   Medications: mounjaro 15mg  Diet & exercise: diabetic diet, regular exericse.   Daily accucheks are appropriate  Hypoglycemic episodes?: none   Labs: due  CV: continue statin  Renal: due   Eye: due    Foot: UTD  Immunizations: UTD  RTC in 6mo for T2DM follow up.   -     Microalb/Creat Ratio, Random Urine; Future; Expected date: 04/30/2025  -     Mounjaro; Inject 15 mg into the skin once a week.  Dispense: 2 mL; Refill: 5    2. Screening for prostate cancer  -     PSA Total, Screen; Future; Expected date: 04/30/2025    3. Laboratory examination ordered as part of a routine general medical examination  -     CBC With Differential With Platelet; Future; Expected date: 04/30/2025    4. Laboratory exam ordered as part of routine general medical examination  -     TSH W Reflex To Free T4; Future; Expected date: 04/30/2025    5. Tinea pedis of both feet  Start topical clotrimazole. Follow good hygiene measures. Advised to keep area clean/dry. F/u if no  improvement or worsening of symptoms.   -     Clotrimazole; Apply 1 Application topically 2 (two) times daily for 14 days.  Dispense: 28 g; Refill: 0    6. Positive colorectal cancer screening using Cologuard test  - Surgery Referral - In Network    7. Essential hypertension  Stable, CPM. Due for labs.     8. Mixed hyperlipidemia  Stable, CPM. Due for labs.      Return in about 6 months (around 10/30/2025) for annual physical.        Bryson Khan MD, 4/30/2025, 5:50 PM             [1]   Current Outpatient Medications   Medication Sig Dispense Refill    Tirzepatide (MOUNJARO) 15 MG/0.5ML Subcutaneous Solution Auto-injector Inject 15 mg into the skin once a week. 2 mL 5    clotrimazole 1 % External Cream Apply 1 Application topically 2 (two) times daily for 14 days. 28 g 0    tamsulosin 0.4 MG Oral Cap TAKE 1 CAPSULE BY MOUTH EVERY DAY 90 capsule 1    ATORVASTATIN 10 MG Oral Tab TAKE 1 TABLET BY MOUTH EVERY DAY 90 tablet 1    LISINOPRIL-HYDROCHLOROTHIAZIDE 20-25 MG Oral Tab TAKE 1 TABLET BY MOUTH EVERY DAY 90 tablet 1    METFORMIN 500 MG Oral Tab TAKE 1 TABLET BY MOUTH EVERY DAY WITH BREAKFAST 90 tablet 1    Tadalafil 20 MG Oral Tab Take 1 tablet (20 mg total) by mouth daily as needed for Erectile Dysfunction. 12 tablet 3    Glucose Blood (ONETOUCH ULTRA) In Vitro Strip Check blood sugar once daily 100 each 2

## 2025-04-30 NOTE — PROGRESS NOTES
The following individual(s) verbally consented to be recorded using ambient AI listening technology and understand that they can each withdraw their consent to this listening technology at any point by asking the clinician to turn off or pause the recording:    Patient name: Randy Eubanks  Additional names:

## 2025-04-30 NOTE — PATIENT INSTRUCTIONS
[employer]™  What is Type 2 Diabetes?  Watch this clip to understand what happens within your body when you have type 2 diabetes, and the importance of keeping your blood glucose levels within a healthy range.  To watch the video:  Scan the QR code  Using your mobile device, scan the following code:  OR  Go to the website:  [place]  Enter the prescription code:  1576E    © 1069-3020 [employer]. All rights reserved. This information is not intended as a substitute for professional medical care. Always follow your healthcare professional's instructions.    Managing Type 2 Diabetes  Type 2 diabetes is a long-term (chronic) condition. Managing diabetes may mean making some tough changes. Yourhealthcare team can help you.  To manage type 2 diabetes, you'll need to balance your medicine with diet and activity. You will also need check your blood sugar. And work with yourhealthcare provider to prevent complications.    Take your medicine  You may take pills or give yourself insulin shots for diabetes. Or you may use both. Take your medicines or give yourself insulin at the right times to help you control your blood sugar. Think about ways that will help you remember to take your medicines the right way every day. Ask your healthcare provider orteam for ideas.  You may only take pills for your diabetes. But this may change. Over time, mostpeople with type 2 diabetes also need insulin.  Eat healthy  A healthy diet helps control the amount of sugar in your blood. It also helps you stay at a healthy weight. Or it helps you lose weight, if if you'reoverweight. Extra weight makes it harder to control diabetes.  Your healthcare team will help you create a plan that works for you. You don'thave to give up all the foods you like. Have meals and snacks with:  Vegetables  Fruits  Lean meats or other healthy proteins  Whole grains  Low- or nonfat dairy products     Be physically  active  Being active helps lower your blood sugar. Activity helps your body use insulinto turn food into energy. It also helps you manage your weight:  Ask your healthcare provider to help you to make an activity program that's right for you. Your program is based on your age, general health, and types of activity you enjoy. Start slow. But aim for at least 150 minutes of exercise or activity each week. Start with 30 minutes a day. Exercise in 10-minute blocks. Don’t let more than 2 days go by without being active.  Check your blood sugar  Checking your own blood sugar may be a regular part of your care. Or you may only need to check your blood sugar from time to time. Your healthcare provider will tell you how to check your blood sugar at home. Checking it tells you if your blood sugar is in your target range. Having your blood sugars within thetarget range means that you are managing your diabetes well.  If your blood sugar levels are too high or too low, your healthcare provider may suggest changes to your diet or activity level. He or she may also adjustyour medicine.  Your healthcare provider may also tell you to check your blood sugar more oftenwhen you are sick.  Take care of yourself  When you have diabetes, you may be more likely to get other health problems. They include foot, eye, heart, nerve, and kidney problems. You can help prevent these problems by controlling your blood sugar and taking good care of yourself. Your healthcare provider, nurse, diabetes educator, and others canhelp you with the following:  Checkups. You should have regular checkups with your healthcare provider. At those visits, you will have a physical exam that includes checking your feet. Your healthcare provider will also check your blood pressure and weight. Take your shoes off before your appointment starts to be sure your feet are checked.  Other exams. You'll also need eye, foot, and dental exams at least once each year.  Lab  tests. You will have blood and urine tests:  Your healthcare provider will check your hemoglobin A1C at least twice a year. This blood test shows how well you have been controlling your blood sugar over 2 to 3 months. The results help your healthcare provider manage your diabetes.    You will also have other lab tests. For example, to check for kidney problems and abnormal cholesterol levels.  Smoking. If you smoke, you will need to quit. Smoking makes it more likely to get complications from diabetes. Ask your healthcare provider about ways to quit. Also don't use e-cigarette, or vaping, products.  Vaccines. Get a yearly flu shot. And ask your healthcare provider about vaccines to prevent pneumonia, shingles, and hepatitis B.  Stress and depression  Most people have challenges throughout their lives. Living with diabetes can increase your stress. Feeling stressed or depressed can actually affect yourblood sugar levels.  Tell your healthcare provider if you are having trouble coping with diabetes.He or she can help or refer you to other providers or programs.  To learn more  Know where you can get help. You can try the following:  Support. Ask family and friends to support your efforts to take care of yourself. Or look for a diabetes support group nearby or on the internet. Check the Connect with Others link on www.diabetes.org.  Counseling. Talk with a , psychologist, psychiatrist, or other counselor.  Information. Contact the American Diabetes Association at 320-547-6752 or www.diabetes.org  [place] last reviewed this educational content on11/1/2019    © 5963-0329 [employer]. All rights reserved. This information is not intended as a substitute for professional medical care. Always follow yourhealthcare professional's instructions.    Type 2 Diabetes and Food Choices  You make food choices every day. Whole wheat or white bread? A side of French fries or fresh fruit? Eat now or later?  Choices about what, when, and how much you eat affect your blood sugar (glucose), and also your blood pressure and cholesterol. Understanding how food affects blood glucose is the first step in managing diabetes. And following a diabetesmeal plan can help you keep your blood glucose levels on track.   Prevent problems  Having type 2 diabetes means that your body doesn’t control blood glucose well. When blood glucose stays too high for too long, serious health problems can develop. It's important to control your blood glucose through diet, exercise, and medicine. This can delay or prevent kidney,eye, nerve, and heart disease, and other complications of diabetes.   Control carbohydrates  Carbohydrates are foods that have the biggest effect on your blood glucose levels. After you eat carbohydrates, your blood glucose rises. Fruit, sweet foods and drinks, starchy foods (such as bread, potatoes, and rice), and milk and milk products contain carbohydrates. Carbohydrates are important for health. But when you eat too many at once, your blood glucose can go too high. This is even more likely if you don't have or take enough insulin forthat food.   Some carbohydrates may raise blood glucose more than others. These include potatoes, sweets, and white bread. Better choices are less processed foods with more fiber and nutrients. Good options are 100% whole-wheat bread, oatmeal,brown rice, and nonstarchy vegetables.   Learn to use food labels that show added sugar. And try to find healthierchoices, particularly if you are overweight.    Food and medicine  Insulin helps glucose move from the blood into your muscle cells, where it can be used for energy. Some diabetes medicines that are taken by mouth help you make more insulin. Or they help your insulin work more efficiently. So your medicines and food plan have to work together. If you take insulin shots, you need to be very careful to match the amount of carbohydrates you eat  with your insulin dose. If you have too many carbohydrates without adjusting your insulin dose, your blood glucose might become too high. If you have too few carbohydrates, your blood glucose might be too low. Your healthcare provider ora dietitian can help you match your food choices to your medicine.   Have a meal plan  With certain medicines, it's best to eat the same amount of food at the same time every day. That keeps your glucose levels stable. And it helps your medicine work best. Physical activity is an important way to control blood glucose, too. Try to exercise at the same time every day. That way you can build the extra calories you need for exercise into your meal plan. With othermedicines, you may have more choices about how much you eat and when.   If you want to change your medicine to better fit your lifestyle, talk withyour healthcare provider.   Eat smart  You can eat the same foods as everyone else, but you have to carefully watch for certain details. That’s where your diabetes meal plan comes in. A personal meal plan tells you the time of day to eat meals and snacks, the types of food to eat, and how much. Itshould include your favorite foods. And it should focus on these healthy foods:   Whole grains, such as 100% whole-wheat bread, brown rice, and oatmeal  Nonfat or low-fat dairy products, such as nonfat milk and yogurt (but be sure these products don't have sugar added to make up for the fat removed)  Lean meats, poultry, fish, eggs, and dried beans and peas  Foods and drinks with no added sugar  Fruits and vegetables  At first, it may be helpful to use measuring cups and spoons to make sure you’re really eating the amount of food that’s in your plan. You can also use standardized portion sizes on food labels, such as 1 serving of meat being the size of a deck of cards. By checking your blood glucose 1 to 2 hours after eating, you can learn how your food choicesaffect your blood glucose.   To  create a diabetes meal plan or change a plan that’s not working for you, see a dietitian or diabetes educator. Let them know if you have any new health concerns or if your medicines have changed. Having ameal plan that you can live with will keep you at your healthy best.     © 3941-1441 [employer]. All rights reserved. This information is not intended as a substitute for professional medical care. Always follow yourhealthcare professional's instructions.    Diabetes: Caring for Your Body   When you have diabetes, your body needs special care. This care helps you stay healthy and prevent problems. Exercise and healthy eating are a part of this. You can also protect yourself by taking special care of your feet, skin, teeth,and eyes.   Caring for your feet     Follow these tips to help keep your feet healthy:  Check your feet every day for redness, blisters, cracks, dry skin, or numbness. Use a mirror to check the bottoms of your feet, if needed. Or ask for help.  Wash your feet in warm (not hot) water. Don’t soak them.  Use an emery board to keep your toenails even with the ends of your toes. File away sharp edges. A foot doctor (podiatrist) may need to cut your toenails for you.  Smooth down calluses gently or wait until your next podiatry appointment.  Keep your skin soft and smooth by putting a thin layer of skin lotion on the tops and bottoms of your feet. Don't put lotion in between your toes.  Always wear shoes or slippers, even inside your home. Make sure that shoes are correctly fitted, not too tight and not too loose. This can cause friction and rubbing of your feet. Change your socks daily. Always check shoes for foreign objects before putting them on.  Call your healthcare provider right away if your feet are numb or painful. Also call your provider if a cut or sore doesn’t heal in a few days.  Preventing skin infections   To prevent skin infections, bathe every day, but use a moderate water  temperature.. Dry yourself well, especially between your toes. Try to keep your home on the humid side during the colder months of the year to prevent your skin getting dry. Wash any cuts with warm, soapy water. Cover with a sterile bandage. Call your healthcare provider if a cut or sore doesn't heal in a fewdays, feels warm, itches, is swollen, or has a bad smell.   Caring for your teeth   Follow these guidelines for healthy teeth:  Brush your teeth twice daily.  Floss your teeth daily.  See your dentist at least twice yearly.  Keep your blood sugar in a good range.  Caring for your eyes  Have your eyes checked every year by an optometrist or ophthalmologist. Letyour healthcare provider know if you experience any of the following symptoms:   Blurred vision  Dark spots or \"holes\"  Flashes of light  Seeing more floaters than usual  Poor night vision  If you smoke, quit  Smoking is dangerous for everyone, especially people with diabetes. It can harm the blood vessels in your eyes, kidneys, nerves, and heart. It raises blood pressure. Smoking can also slow healing, so infections are more likely. Askyour healthcare provider about programs to help you stop smoking.   [employer] last reviewed this educational content on12/1/2021 © 2000-2022 [employer]. All rights reserved. This information is not intended as a substitute for professional medical care. Always follow yourhealthcare professional's instructions.

## 2025-05-24 ENCOUNTER — LAB ENCOUNTER (OUTPATIENT)
Dept: LAB | Age: 55
End: 2025-05-24
Attending: FAMILY MEDICINE
Payer: COMMERCIAL

## 2025-05-24 DIAGNOSIS — Z00.00 LABORATORY EXAMINATION ORDERED AS PART OF A ROUTINE GENERAL MEDICAL EXAMINATION: ICD-10-CM

## 2025-05-24 DIAGNOSIS — Z12.5 SCREENING FOR PROSTATE CANCER: ICD-10-CM

## 2025-05-24 DIAGNOSIS — Z00.00 LABORATORY EXAM ORDERED AS PART OF ROUTINE GENERAL MEDICAL EXAMINATION: ICD-10-CM

## 2025-05-24 DIAGNOSIS — E11.9 TYPE 2 DIABETES MELLITUS WITHOUT COMPLICATION, WITHOUT LONG-TERM CURRENT USE OF INSULIN (HCC): ICD-10-CM

## 2025-05-24 LAB
ALBUMIN SERPL-MCNC: 4.8 G/DL (ref 3.2–4.8)
ALBUMIN/GLOB SERPL: 1.6 {RATIO} (ref 1–2)
ALP LIVER SERPL-CCNC: 40 U/L (ref 45–117)
ALT SERPL-CCNC: 53 U/L (ref 10–49)
ANION GAP SERPL CALC-SCNC: 11 MMOL/L (ref 0–18)
AST SERPL-CCNC: 39 U/L (ref ?–34)
BASOPHILS # BLD AUTO: 0.02 X10(3) UL (ref 0–0.2)
BASOPHILS NFR BLD AUTO: 0.4 %
BILIRUB SERPL-MCNC: 0.6 MG/DL (ref 0.3–1.2)
BUN BLD-MCNC: 12 MG/DL (ref 9–23)
CALCIUM BLD-MCNC: 9.6 MG/DL (ref 8.7–10.6)
CHLORIDE SERPL-SCNC: 102 MMOL/L (ref 98–112)
CHOLEST SERPL-MCNC: 115 MG/DL (ref ?–200)
CO2 SERPL-SCNC: 29 MMOL/L (ref 21–32)
COMPLEXED PSA SERPL-MCNC: 0.37 NG/ML (ref ?–4)
CREAT BLD-MCNC: 0.99 MG/DL (ref 0.7–1.3)
CREAT UR-SCNC: 166.9 MG/DL
EGFRCR SERPLBLD CKD-EPI 2021: 90 ML/MIN/1.73M2 (ref 60–?)
EOSINOPHIL # BLD AUTO: 0.08 X10(3) UL (ref 0–0.7)
EOSINOPHIL NFR BLD AUTO: 1.7 %
ERYTHROCYTE [DISTWIDTH] IN BLOOD BY AUTOMATED COUNT: 12.9 %
EST. AVERAGE GLUCOSE BLD GHB EST-MCNC: 123 MG/DL (ref 68–126)
FASTING PATIENT LIPID ANSWER: YES
FASTING STATUS PATIENT QL REPORTED: YES
GLOBULIN PLAS-MCNC: 3 G/DL (ref 2–3.5)
GLUCOSE BLD-MCNC: 119 MG/DL (ref 70–99)
HBA1C MFR BLD: 5.9 % (ref ?–5.7)
HCT VFR BLD AUTO: 46 % (ref 39–53)
HDLC SERPL-MCNC: 36 MG/DL (ref 40–59)
HGB BLD-MCNC: 15.8 G/DL (ref 13–17.5)
IMM GRANULOCYTES # BLD AUTO: 0.01 X10(3) UL (ref 0–1)
IMM GRANULOCYTES NFR BLD: 0.2 %
LDLC SERPL CALC-MCNC: 62 MG/DL (ref ?–100)
LYMPHOCYTES # BLD AUTO: 2.1 X10(3) UL (ref 1–4)
LYMPHOCYTES NFR BLD AUTO: 44.4 %
MCH RBC QN AUTO: 29 PG (ref 26–34)
MCHC RBC AUTO-ENTMCNC: 34.3 G/DL (ref 31–37)
MCV RBC AUTO: 84.4 FL (ref 80–100)
MICROALBUMIN UR-MCNC: <0.3 MG/DL
MONOCYTES # BLD AUTO: 0.31 X10(3) UL (ref 0.1–1)
MONOCYTES NFR BLD AUTO: 6.6 %
NEUTROPHILS # BLD AUTO: 2.21 X10 (3) UL (ref 1.5–7.7)
NEUTROPHILS # BLD AUTO: 2.21 X10(3) UL (ref 1.5–7.7)
NEUTROPHILS NFR BLD AUTO: 46.7 %
NONHDLC SERPL-MCNC: 79 MG/DL (ref ?–130)
OSMOLALITY SERPL CALC.SUM OF ELEC: 295 MOSM/KG (ref 275–295)
PLATELET # BLD AUTO: 147 10(3)UL (ref 150–450)
POTASSIUM SERPL-SCNC: 4.2 MMOL/L (ref 3.5–5.1)
PROT SERPL-MCNC: 7.8 G/DL (ref 5.7–8.2)
RBC # BLD AUTO: 5.45 X10(6)UL (ref 4.3–5.7)
SODIUM SERPL-SCNC: 142 MMOL/L (ref 136–145)
TRIGL SERPL-MCNC: 89 MG/DL (ref 30–149)
TSI SER-ACNC: 1.13 UIU/ML (ref 0.55–4.78)
VLDLC SERPL CALC-MCNC: 13 MG/DL (ref 0–30)
WBC # BLD AUTO: 4.7 X10(3) UL (ref 4–11)

## 2025-05-24 PROCEDURE — 83036 HEMOGLOBIN GLYCOSYLATED A1C: CPT

## 2025-05-24 PROCEDURE — 82043 UR ALBUMIN QUANTITATIVE: CPT

## 2025-05-24 PROCEDURE — 85025 COMPLETE CBC W/AUTO DIFF WBC: CPT

## 2025-05-24 PROCEDURE — 82570 ASSAY OF URINE CREATININE: CPT

## 2025-05-24 PROCEDURE — 84443 ASSAY THYROID STIM HORMONE: CPT

## 2025-05-24 PROCEDURE — 80053 COMPREHEN METABOLIC PANEL: CPT

## 2025-05-24 PROCEDURE — 80061 LIPID PANEL: CPT

## 2025-05-24 PROCEDURE — 36415 COLL VENOUS BLD VENIPUNCTURE: CPT

## 2025-07-03 ENCOUNTER — OFFICE VISIT (OUTPATIENT)
Facility: LOCATION | Age: 55
End: 2025-07-03
Payer: COMMERCIAL

## 2025-07-03 VITALS
OXYGEN SATURATION: 96 % | DIASTOLIC BLOOD PRESSURE: 62 MMHG | HEART RATE: 74 BPM | TEMPERATURE: 98 F | SYSTOLIC BLOOD PRESSURE: 89 MMHG

## 2025-07-03 DIAGNOSIS — R19.5 POSITIVE COLORECTAL CANCER SCREENING USING COLOGUARD TEST: Primary | ICD-10-CM

## 2025-07-03 RX ORDER — POLYETHYLENE GLYCOL 3350, SODIUM CHLORIDE, SODIUM BICARBONATE, POTASSIUM CHLORIDE 420; 11.2; 5.72; 1.48 G/4L; G/4L; G/4L; G/4L
POWDER, FOR SOLUTION ORAL
Qty: 1 EACH | Refills: 0 | Status: SHIPPED | OUTPATIENT
Start: 2025-07-03

## 2025-07-07 ENCOUNTER — TELEPHONE (OUTPATIENT)
Facility: LOCATION | Age: 55
End: 2025-07-07

## 2025-07-07 NOTE — TELEPHONE ENCOUNTER
No prior authorization required for cpt code 29524. Ref #: [id number]. Spoke with automated services through [employer].

## 2025-07-07 NOTE — H&P
New Patient Visit Note    The following individual(s) verbally consented to be recorded using ambient AI listening technology and understand that they can each withdraw their consent to this listening technology at any point by asking the clinician to turn off or pause the recording:    Patient name: Randy Eubanks        Active Problems      1. Positive colorectal cancer screening using Cologuard test        Chief Complaint   Chief Complaint   Patient presents with    New Patient     NP - cscope consult, positive Cologuard, No family hx of colon cancer. This will be pt's first cscope. Pt denies any symptoms.          History of Present Illness   History of Present Illness  Randy Eubanks is a 55 year old male who presents with a positive Cologuard test.    He has not undergone a colonoscopy previously and has no family history of colorectal cancer. No blood in stool or recent changes in bowel habits. He notes darker stool when consuming a lot of black coffee but no unusual changes otherwise.    His blood pressure has been running low lately, but he does not experience dizziness or lightheadedness. He is unsure about any history of low blood count or anemia.  Patient did have a CBC on 5/24/2025 which revealed a normal hemoglobin of 15.8.    He works in construction as a , which makes scheduling medical appointments challenging. He attempted to schedule a colonoscopy in the past, but the procedure was canceled, and work commitments have made rescheduling difficult.         Allergies  Randy has no known allergies.    Past Medical / Surgical / Social / Family History    The past medical and past surgical history have been reviewed by me today.    Past Medical History[1]  Past Surgical History[2]    The family history and social history have been reviewed by me today.    Family History[3]  Social Hx on file[4]   Medications - Current[5]      Review of Systems  A 10 point review of systems was performed and  negative unless otherwise documented per HPI.    Physical Findings   BP (!) 89/62 (BP Location: Right arm, Patient Position: Sitting, Cuff Size: large)   Pulse 74   Temp 97.8 °F (36.6 °C) (Temporal)   SpO2 96%   Physical Exam  Vitals and nursing note reviewed. Exam conducted with a chaperone present.   Constitutional:       General: He is not in acute distress.  HENT:      Head: Normocephalic and atraumatic.      Mouth/Throat:      Mouth: Mucous membranes are moist.   Cardiovascular:      Rate and Rhythm: Normal rate and regular rhythm.   Pulmonary:      Effort: Pulmonary effort is normal.   Abdominal:      General: There is no distension.      Palpations: Abdomen is soft.      Tenderness: There is no abdominal tenderness.   Musculoskeletal:         General: No deformity.   Skin:     General: Skin is warm and dry.   Neurological:      General: No focal deficit present.      Mental Status: He is alert.   Psychiatric:         Mood and Affect: Mood normal.             Assessment and Plan   Assessment & Plan  Positive Cologuard test  Positive Cologuard test over a year ago with no family history of colorectal cancer, no visible blood in stool, and no changes in bowel habits. Colonoscopy recommended to investigate potential risk of colorectal cancer.  - Schedule colonoscopy at [facility] as soon as possible.  - The details of the procedure were discussed including the prep instructions, risks, benefits and alternatives  - Patient expressed understanding and was agreeable to schedule the colonoscopy procedure with me.  This has been scheduled for 7/22/2025 at [facility] under monitored anesthesia care.  - Ensure bowel preparation with clear liquids day prior to procedure  - Advise no driving or work on the day of the procedure.    Low blood pressure  Reports of low blood pressure over the last few years without symptoms of dizziness or lightheadedness. No immediate concerns.  - Follow-up with PCP, Dr. Khan        Imaging & Referrals   None    Follow Up  No follow-ups on file.    Chaparro Richards MD       [1]   Past Medical History:   Aspiration of food    Diabetes (HCC)    Obesity    Sleep apnea    Vomiting   [2]   Past Surgical History:  Procedure Laterality Date    Other surgical history      gastric lap band     Vasectomy     [3]   Family History  Problem Relation Age of Onset    Heart Attack Father     Heart Surgery Father     Heart Disease Father     Diabetes Mother    [4]   Social History  Socioeconomic History    Marital status:    Tobacco Use    Smoking status: Former     Current packs/day: 0.00     Types: Cigarettes     Quit date: 2006     Years since quittin.5    Smokeless tobacco: Never   Vaping Use    Vaping status: Never Used   Substance and Sexual Activity    Alcohol use: Yes     Comment: occ.    Drug use: Never   Other Topics Concern    Caffeine Concern No    Weight Concern Yes    Exercise No    Seat Belt No    Special Diet No    Stress Concern No   [5]   Current Outpatient Medications:     PEG 3350-KCl-Na Bicarb-NaCl 420 g Oral Recon Soln, Starting at 4:00 pm the night before procedure, drink 8 ounces of the prep every 15-20 minutes until finished, Disp: 1 each, Rfl: 0    Tirzepatide (MOUNJARO) 15 MG/0.5ML Subcutaneous Solution Auto-injector, Inject 15 mg into the skin once a week., Disp: 2 mL, Rfl: 5    tamsulosin 0.4 MG Oral Cap, TAKE 1 CAPSULE BY MOUTH EVERY DAY, Disp: 90 capsule, Rfl: 1    ATORVASTATIN 10 MG Oral Tab, TAKE 1 TABLET BY MOUTH EVERY DAY, Disp: 90 tablet, Rfl: 1    LISINOPRIL-HYDROCHLOROTHIAZIDE 20-25 MG Oral Tab, TAKE 1 TABLET BY MOUTH EVERY DAY, Disp: 90 tablet, Rfl: 1    METFORMIN 500 MG Oral Tab, TAKE 1 TABLET BY MOUTH EVERY DAY WITH BREAKFAST, Disp: 90 tablet, Rfl: 1    Tadalafil 20 MG Oral Tab, Take 1 tablet (20 mg total) by mouth daily as needed for Erectile Dysfunction., Disp: 12 tablet, Rfl: 3    Glucose Blood (ONETOUCH ULTRA) In Vitro Strip, Check blood sugar  once daily, Disp: 100 each, Rfl: 2

## 2025-07-07 NOTE — H&P (VIEW-ONLY)
New Patient Visit Note    The following individual(s) verbally consented to be recorded using ambient AI listening technology and understand that they can each withdraw their consent to this listening technology at any point by asking the clinician to turn off or pause the recording:    Patient name: Randy Eubanks        Active Problems      1. Positive colorectal cancer screening using Cologuard test        Chief Complaint   Chief Complaint   Patient presents with    New Patient     NP - cscope consult, positive Cologuard, No family hx of colon cancer. This will be pt's first cscope. Pt denies any symptoms.          History of Present Illness   History of Present Illness  Randy Eubanks is a 55 year old male who presents with a positive Cologuard test.    He has not undergone a colonoscopy previously and has no family history of colorectal cancer. No blood in stool or recent changes in bowel habits. He notes darker stool when consuming a lot of black coffee but no unusual changes otherwise.    His blood pressure has been running low lately, but he does not experience dizziness or lightheadedness. He is unsure about any history of low blood count or anemia.  Patient did have a CBC on 5/24/2025 which revealed a normal hemoglobin of 15.8.    He works in construction as a , which makes scheduling medical appointments challenging. He attempted to schedule a colonoscopy in the past, but the procedure was canceled, and work commitments have made rescheduling difficult.         Allergies  Randy has no known allergies.    Past Medical / Surgical / Social / Family History    The past medical and past surgical history have been reviewed by me today.    Past Medical History[1]  Past Surgical History[2]    The family history and social history have been reviewed by me today.    Family History[3]  Social Hx on file[4]   Medications - Current[5]      Review of Systems  A 10 point review of systems was performed and  negative unless otherwise documented per HPI.    Physical Findings   BP (!) 89/62 (BP Location: Right arm, Patient Position: Sitting, Cuff Size: large)   Pulse 74   Temp 97.8 °F (36.6 °C) (Temporal)   SpO2 96%   Physical Exam  Vitals and nursing note reviewed. Exam conducted with a chaperone present.   Constitutional:       General: He is not in acute distress.  HENT:      Head: Normocephalic and atraumatic.      Mouth/Throat:      Mouth: Mucous membranes are moist.   Cardiovascular:      Rate and Rhythm: Normal rate and regular rhythm.   Pulmonary:      Effort: Pulmonary effort is normal.   Abdominal:      General: There is no distension.      Palpations: Abdomen is soft.      Tenderness: There is no abdominal tenderness.   Musculoskeletal:         General: No deformity.   Skin:     General: Skin is warm and dry.   Neurological:      General: No focal deficit present.      Mental Status: He is alert.   Psychiatric:         Mood and Affect: Mood normal.             Assessment and Plan   Assessment & Plan  Positive Cologuard test  Positive Cologuard test over a year ago with no family history of colorectal cancer, no visible blood in stool, and no changes in bowel habits. Colonoscopy recommended to investigate potential risk of colorectal cancer.  - Schedule colonoscopy at [facility] as soon as possible.  - The details of the procedure were discussed including the prep instructions, risks, benefits and alternatives  - Patient expressed understanding and was agreeable to schedule the colonoscopy procedure with me.  This has been scheduled for 7/22/2025 at [facility] under monitored anesthesia care.  - Ensure bowel preparation with clear liquids day prior to procedure  - Advise no driving or work on the day of the procedure.    Low blood pressure  Reports of low blood pressure over the last few years without symptoms of dizziness or lightheadedness. No immediate concerns.  - Follow-up with PCP, Dr. Khan        Imaging & Referrals   None    Follow Up  No follow-ups on file.    Chaparro Richards MD       [1]   Past Medical History:   Aspiration of food    Diabetes (HCC)    Obesity    Sleep apnea    Vomiting   [2]   Past Surgical History:  Procedure Laterality Date    Other surgical history      gastric lap band     Vasectomy     [3]   Family History  Problem Relation Age of Onset    Heart Attack Father     Heart Surgery Father     Heart Disease Father     Diabetes Mother    [4]   Social History  Socioeconomic History    Marital status:    Tobacco Use    Smoking status: Former     Current packs/day: 0.00     Types: Cigarettes     Quit date: 2006     Years since quittin.5    Smokeless tobacco: Never   Vaping Use    Vaping status: Never Used   Substance and Sexual Activity    Alcohol use: Yes     Comment: occ.    Drug use: Never   Other Topics Concern    Caffeine Concern No    Weight Concern Yes    Exercise No    Seat Belt No    Special Diet No    Stress Concern No   [5]   Current Outpatient Medications:     PEG 3350-KCl-Na Bicarb-NaCl 420 g Oral Recon Soln, Starting at 4:00 pm the night before procedure, drink 8 ounces of the prep every 15-20 minutes until finished, Disp: 1 each, Rfl: 0    Tirzepatide (MOUNJARO) 15 MG/0.5ML Subcutaneous Solution Auto-injector, Inject 15 mg into the skin once a week., Disp: 2 mL, Rfl: 5    tamsulosin 0.4 MG Oral Cap, TAKE 1 CAPSULE BY MOUTH EVERY DAY, Disp: 90 capsule, Rfl: 1    ATORVASTATIN 10 MG Oral Tab, TAKE 1 TABLET BY MOUTH EVERY DAY, Disp: 90 tablet, Rfl: 1    LISINOPRIL-HYDROCHLOROTHIAZIDE 20-25 MG Oral Tab, TAKE 1 TABLET BY MOUTH EVERY DAY, Disp: 90 tablet, Rfl: 1    METFORMIN 500 MG Oral Tab, TAKE 1 TABLET BY MOUTH EVERY DAY WITH BREAKFAST, Disp: 90 tablet, Rfl: 1    Tadalafil 20 MG Oral Tab, Take 1 tablet (20 mg total) by mouth daily as needed for Erectile Dysfunction., Disp: 12 tablet, Rfl: 3    Glucose Blood (ONETOUCH ULTRA) In Vitro Strip, Check blood sugar  once daily, Disp: 100 each, Rfl: 2

## 2025-07-10 ENCOUNTER — APPOINTMENT (OUTPATIENT)
Dept: ADMINISTRATIVE | Facility: HOSPITAL | Age: 55
End: 2025-07-10
Payer: COMMERCIAL

## 2025-07-13 DIAGNOSIS — E11.9 TYPE 2 DIABETES MELLITUS WITHOUT COMPLICATION, WITHOUT LONG-TERM CURRENT USE OF INSULIN (HCC): ICD-10-CM

## 2025-07-13 DIAGNOSIS — I10 HYPERTENSION, UNSPECIFIED TYPE: ICD-10-CM

## 2025-07-13 DIAGNOSIS — E78.2 MIXED HYPERLIPIDEMIA: ICD-10-CM

## 2025-07-17 RX ORDER — ATORVASTATIN CALCIUM 10 MG/1
10 TABLET, FILM COATED ORAL DAILY
Qty: 90 TABLET | Refills: 3 | Status: SHIPPED | OUTPATIENT
Start: 2025-07-17

## 2025-07-17 RX ORDER — LISINOPRIL AND HYDROCHLOROTHIAZIDE 20; 25 MG/1; MG/1
1 TABLET ORAL DAILY
Qty: 90 TABLET | Refills: 3 | Status: SHIPPED | OUTPATIENT
Start: 2025-07-17

## 2025-07-17 RX ORDER — TAMSULOSIN HYDROCHLORIDE 0.4 MG/1
0.4 CAPSULE ORAL DAILY
Qty: 90 CAPSULE | Refills: 3 | Status: SHIPPED | OUTPATIENT
Start: 2025-07-17

## 2025-07-22 ENCOUNTER — HOSPITAL ENCOUNTER (OUTPATIENT)
Facility: HOSPITAL | Age: 55
Setting detail: HOSPITAL OUTPATIENT SURGERY
Discharge: HOME | End: 2025-07-22
Attending: STUDENT IN AN ORGANIZED HEALTH CARE EDUCATION/TRAINING PROGRAM | Admitting: STUDENT IN AN ORGANIZED HEALTH CARE EDUCATION/TRAINING PROGRAM

## 2025-07-22 ENCOUNTER — ANESTHESIA EVENT (OUTPATIENT)
Dept: ENDOSCOPY | Facility: HOSPITAL | Age: 55
End: 2025-07-22
Payer: COMMERCIAL

## 2025-07-22 ENCOUNTER — ANESTHESIA (OUTPATIENT)
Dept: ENDOSCOPY | Facility: HOSPITAL | Age: 55
End: 2025-07-22
Payer: COMMERCIAL

## 2025-07-22 VITALS
TEMPERATURE: 98 F | OXYGEN SATURATION: 97 % | RESPIRATION RATE: 18 BRPM | SYSTOLIC BLOOD PRESSURE: 111 MMHG | HEIGHT: 72 IN | BODY MASS INDEX: 39.01 KG/M2 | HEART RATE: 81 BPM | WEIGHT: 288 LBS | DIASTOLIC BLOOD PRESSURE: 76 MMHG

## 2025-07-22 DIAGNOSIS — R19.5 POSITIVE COLORECTAL CANCER SCREENING USING COLOGUARD TEST: ICD-10-CM

## 2025-07-22 PROBLEM — K63.5 POLYP OF SIGMOID COLON: Status: ACTIVE | Noted: 2025-07-22

## 2025-07-22 LAB — GLUCOSE BLD-MCNC: 80 MG/DL (ref 70–99)

## 2025-07-22 PROCEDURE — 45385 COLONOSCOPY W/LESION REMOVAL: CPT | Performed by: STUDENT IN AN ORGANIZED HEALTH CARE EDUCATION/TRAINING PROGRAM

## 2025-07-22 RX ORDER — SODIUM CHLORIDE, SODIUM LACTATE, POTASSIUM CHLORIDE, CALCIUM CHLORIDE 600; 310; 30; 20 MG/100ML; MG/100ML; MG/100ML; MG/100ML
INJECTION, SOLUTION INTRAVENOUS CONTINUOUS
OUTPATIENT
Start: 2025-07-22

## 2025-07-22 RX ORDER — NALOXONE HYDROCHLORIDE 0.4 MG/ML
0.08 INJECTION, SOLUTION INTRAMUSCULAR; INTRAVENOUS; SUBCUTANEOUS ONCE AS NEEDED
OUTPATIENT
Start: 2025-07-22 | End: 2025-07-22

## 2025-07-22 RX ORDER — LIDOCAINE HYDROCHLORIDE 10 MG/ML
INJECTION, SOLUTION EPIDURAL; INFILTRATION; INTRACAUDAL; PERINEURAL AS NEEDED
Status: DISCONTINUED | OUTPATIENT
Start: 2025-07-22 | End: 2025-07-22 | Stop reason: SURG

## 2025-07-22 RX ORDER — NICOTINE POLACRILEX 4 MG
15 LOZENGE BUCCAL
Status: DISCONTINUED | OUTPATIENT
Start: 2025-07-22 | End: 2025-07-23

## 2025-07-22 RX ORDER — NICOTINE POLACRILEX 4 MG
30 LOZENGE BUCCAL
Status: DISCONTINUED | OUTPATIENT
Start: 2025-07-22 | End: 2025-07-23

## 2025-07-22 RX ORDER — SODIUM CHLORIDE, SODIUM LACTATE, POTASSIUM CHLORIDE, CALCIUM CHLORIDE 600; 310; 30; 20 MG/100ML; MG/100ML; MG/100ML; MG/100ML
INJECTION, SOLUTION INTRAVENOUS CONTINUOUS
Status: DISCONTINUED | OUTPATIENT
Start: 2025-07-22 | End: 2025-07-23

## 2025-07-22 RX ORDER — DEXTROSE MONOHYDRATE 25 G/50ML
50 INJECTION, SOLUTION INTRAVENOUS
Status: DISCONTINUED | OUTPATIENT
Start: 2025-07-22 | End: 2025-07-23

## 2025-07-22 RX ADMIN — SODIUM CHLORIDE, SODIUM LACTATE, POTASSIUM CHLORIDE, CALCIUM CHLORIDE: 600; 310; 30; 20 INJECTION, SOLUTION INTRAVENOUS at 13:15:00

## 2025-07-22 RX ADMIN — LIDOCAINE HYDROCHLORIDE 50 MG: 10 INJECTION, SOLUTION EPIDURAL; INFILTRATION; INTRACAUDAL; PERINEURAL at 13:15:00

## 2025-07-22 RX ADMIN — SODIUM CHLORIDE, SODIUM LACTATE, POTASSIUM CHLORIDE, CALCIUM CHLORIDE: 600; 310; 30; 20 INJECTION, SOLUTION INTRAVENOUS at 13:18:00

## 2025-07-22 NOTE — DISCHARGE INSTRUCTIONS
Home Care Instructions for Colonoscopy with Sedation    Diet:  - Resume your regular diet   - Start with light meals to minimize bloating.  - Do not drink alcohol today.    Medication:  - If you have questions about resuming your normal medications, please contact your Primary Care Physician.    Activities:  - Take it easy today. Do not return to work today.  - Do not drive today.  - Do not operate any machinery today (including kitchen equipment).    Colonoscopy:  - You may notice some rectal \"spotting\" (a little blood on the toilet tissue) for a day or two after the exam. This is normal.  - If you experience any rectal bleeding (not spotting), persistent tenderness or sharp severe abdominal pains, oral temperature over 100 degrees Fahrenheit, light-headedness or dizziness, or any other problems, contact your doctor.    **If unable to reach your doctor, please go to the [facility] Emergency Room**    - Your referring physician will receive a full report of your examination.  - If you do not hear from your doctor's office within two weeks of your biopsy, please call them for your results.

## 2025-07-22 NOTE — INTERVAL H&P NOTE
Pre-op Diagnosis: Positive colorectal cancer screening using Cologuard test [R19.5]    The above referenced H&P was reviewed by Chaparro Richards MD on 7/22/2025, the patient was examined and no significant changes have occurred in the patient's condition since the H&P was performed.  I discussed with the patient and/or legal representative the potential benefits, risks and side effects of this procedure; the likelihood of the patient achieving goals; and potential problems that might occur during recuperation.  I discussed reasonable alternatives to the procedure, including risks, benefits and side effects related to the alternatives and risks related to not receiving this procedure.  We will proceed with procedure as planned.

## 2025-07-22 NOTE — OPERATIVE REPORT
[facility]  Operative Note    Randy Eubanks Location: OR   CSN [id number] MRN [id number]    1970 Age 55 year old   Admission Date 2025 Operation Date 2025   Attending Physician Chaparro Richards MD Operating Physician Chaparro Richards MD   PCP Bryson Khan MD          Patient Name: Randy Eubanks    Preoperative Diagnosis: Positive colorectal cancer screening using Cologuard test [R19.5]    Postoperative Diagnosis:   Colon polyps x 8    Primary Surgeon: Chaparro Richards MD    Anesthesia: MAC    Procedures: Colonoscopy to the cecum with snare polypectomies x 8    Specimen:   Sigmoid colon polyps x 3  Rectosigmoid polyps x 4  Rectal polyp    Estimated Blood Loss: 2 cc    Complications: None immediate    Condition: Good    Indications for Surgery:   Randy Eubanks is a 55 year old male referred to me after a positive Cologuard test in February of this year.  Patient has never had a colonoscopy.  He denies any rectal bleeding, change in bowel habits, anemia or unintentional weight loss.  No family history of colorectal cancer.  No blood thinners.    I recommended planning for a colonoscopy.  The details of the colonoscopy procedure were discussed including the prep instructions, risks, benefits and alternatives.  Patient expressed understanding and was agreeable to schedule a colonoscopy.    Patient presents for colonoscopy procedure today as scheduled.  He completed the bowel prep as instructed.  Consent was signed.  All questions answered.    Surgical Findings:   The quality of the bowel prep was good.  There were 3 semipedunculated polyps in the sigmoid colon, two were approximately 5-7 mm in size and one was about 3 mm in size.  There were 4 small flat/semipedunculated polyps in the rectosigmoid colon all measuring between 2-3 mm in size.  There was a 7 mm punctated polyp in the mid rectum between the distal and mid rectal valve.    Description of Procedure:   The patient was taken to  the endoscopy suite and positioned in the left lateral decubitus position with knees flexed. Monitored anesthesia care was administered. A time-out was performed.    The perineum and perianal skin were examined. A digital rectal examination was performed.  These were both normal. A well-lubricated adult colonoscope was then inserted and carefully navigated to the cecum. CO2 insufflation was used throughout the procedure. Advancement was accomplished with ease though aided by abdominal pressure due to looping. The appendiceal orifice and ileocecal valve were identified and photographed. The terminal ileum was not intubated. The scope was then withdrawn as the mucosa was circumferentially examined.     There were 3 semipedunculated polyps in the sigmoid colon, two were approximately 5-7 mm in size and one was about 3 mm in size.  All these polyps were removed using a cold snare and retrieved.  The polypectomy sites were hemostatic.  There were 4 small flat/semipedunculated polyps in the rectosigmoid colon all measuring between 2-3 mm in size.  All these polyps were removed using a cold snare and retrieved.  The polypectomy sites were hemostatic.  There was a 7 mm punctated polyp in the mid rectum between the distal and mid rectal valve.  This polyp was removed with a cold snare and retrieved.  The polypectomy site was hemostatic.  In the rectum, the scope was retroflexed to evaluate the anorectal junction.  The scope was straightened and excess gas was suctioned from the colon and the scope removed, terminating the procedure.    Anesthesia was terminated and the patient transported to the recovery unit in good condition. The patient tolerated the procedure well without apparent intraoperative complication.    Follow up:   Patient will need next colonoscopy in 3-5 years pending pathology from colon and rectal polyps.       Chaparro Richards MD  7/22/2025  2:29 PM

## 2025-07-22 NOTE — ANESTHESIA PREPROCEDURE EVALUATION
PRE-OP EVALUATION    Patient Name: Randy Eubanks    Admit Diagnosis: Positive colorectal cancer screening using Cologuard test [R19.5]    Pre-op Diagnosis: Positive colorectal cancer screening using Cologuard test [R19.5]    COLONOSCOPY    Anesthesia Procedure: COLONOSCOPY    Surgeons and Role:     * Chaparro Richards MD - Primary    Pre-op vitals reviewed.  Temp: 97.5 °F (36.4 °C)  Pulse: 70  Resp: 18  BP: 134/87  SpO2: 96 %  Body mass index is 39.06 kg/m².    Current medications reviewed.  Hospital Medications:  Current Medications[1]    Outpatient Medications:   Prescriptions Prior to Admission[2]    Allergies: Patient has no known allergies.      Anesthesia Evaluation    Patient summary reviewed.    Anesthetic Complications  (-) history of anesthetic complications         GI/Hepatic/Renal  Comment: History of alcohol abuse  LAP-BAND surgery status                  (+) liver disease                 Cardiovascular        Exercise tolerance: good     MET: >4    (+) obesity  (+) hypertension   (+) hyperlipidemia          (+) valvular problems/murmurs (Nonrheumatic tricuspid valve disorder)                        Endo/Other      (+) diabetes  type 2,               (+) thrombocytopenia    (+) arthritis       Pulmonary                    (+) sleep apnea       Neuro/Psych        (+) anxiety         (+) neuromuscular disease                     Past Surgical History[3]  Social Hx on file[4]  History   Drug Use Unknown     Available pre-op labs reviewed.  Lab Results   Component Value Date    WBC 4.7 05/24/2025    RBC 5.45 05/24/2025    HGB 15.8 05/24/2025    HCT 46.0 05/24/2025    MCV 84.4 05/24/2025    MCH 29.0 05/24/2025    MCHC 34.3 05/24/2025    RDW 12.9 05/24/2025    .0 (L) 05/24/2025     Lab Results   Component Value Date     05/24/2025    K 4.2 05/24/2025     05/24/2025    CO2 29.0 05/24/2025    BUN 12 05/24/2025    CREATSERUM 0.99 05/24/2025     (H) 05/24/2025    CA 9.6 05/24/2025             Airway      Mallampati: III  Mouth opening: 3 FB  TM distance: 4 - 6 cm  Neck ROM: full Cardiovascular    Cardiovascular exam normal.         Dental    Dentition appears grossly intact         Pulmonary    Pulmonary exam normal.                 Other findings              ASA: 3   Plan: MAC  NPO status verified and patient meets guidelines.    Post-procedure pain management plan discussed with surgeon and patient.    Comment: Plan IV sedation with GA backup.  Risks and benefits of MAC/GA explained including but not limited to aspiration, mouth/dental/airway injury, PONV explained.  Understanding expressed as well as a wish to proceed.    Plan/risks discussed with: patient and spouse                Present on Admission:  **None**             [1]    glucose (Dex4) 15 GM/59ML oral liquid 15 g  15 g Oral Q15 Min PRN    Or    glucose (Glutose) 40% oral gel 15 g  15 g Oral Q15 Min PRN    Or    glucose-vitamin C (Dex-4) chewable tab 4 tablet  4 tablet Oral Q15 Min PRN    Or    dextrose 50% injection 50 mL  50 mL Intravenous Q15 Min PRN    Or    glucose (Dex4) 15 GM/59ML oral liquid 30 g  30 g Oral Q15 Min PRN    Or    glucose (Glutose) 40% oral gel 30 g  30 g Oral Q15 Min PRN    Or    glucose-vitamin C (Dex-4) chewable tab 8 tablet  8 tablet Oral Q15 Min PRN    lactated ringers infusion   Intravenous Continuous   [2]   Medications Prior to Admission   Medication Sig Dispense Refill Last Dose/Taking    metFORMIN 500 MG Oral Tab Take 1 tablet (500 mg total) by mouth daily with breakfast. 90 tablet 3 7/21/2025    atorvastatin 10 MG Oral Tab Take 1 tablet (10 mg total) by mouth daily. 90 tablet 3 7/21/2025    lisinopril-hydroCHLOROthiazide 20-25 MG Oral Tab Take 1 tablet by mouth daily. 90 tablet 3 7/21/2025    tamsulosin 0.4 MG Oral Cap Take 1 capsule (0.4 mg total) by mouth daily. 90 capsule 3 7/21/2025    PEG 3350-KCl-Na Bicarb-NaCl 420 g Oral Recon Soln Starting at 4:00 pm the night before procedure, drink 8 ounces  of the prep every 15-20 minutes until finished 1 each 0 2025    Tirzepatide (MOUNJARO) 15 MG/0.5ML Subcutaneous Solution Auto-injector Inject 15 mg into the skin once a week. (Patient taking differently: Inject 15 mg into the skin once a week. Injects on Monday) 2 mL 5 2025    Tadalafil 20 MG Oral Tab Take 1 tablet (20 mg total) by mouth daily as needed for Erectile Dysfunction. 12 tablet 3 Unknown    Glucose Blood (ONETOUCH ULTRA) In Vitro Strip Check blood sugar once daily 100 each 2    [3]   Past Surgical History:  Procedure Laterality Date    Other surgical history      gastric lap band     Vasectomy     [4]   Social History  Socioeconomic History    Marital status:    Tobacco Use    Smoking status: Former     Current packs/day: 0.00     Types: Cigarettes     Quit date: 2006     Years since quittin.5    Smokeless tobacco: Never   Vaping Use    Vaping status: Never Used   Substance and Sexual Activity    Alcohol use: Not Currently    Drug use: Never   Other Topics Concern    Caffeine Concern No    Weight Concern Yes    Exercise No    Seat Belt No    Special Diet No    Stress Concern No

## 2025-07-22 NOTE — ANESTHESIA POSTPROCEDURE EVALUATION
[facility]    Randy Eubanks Patient Status:  Hospital Outpatient Surgery   Age/Gender 55 year old male MRN [id number]   Location [facility] Attending Chaparro Richards MD   Hosp Day # 0 PCP Bryson Khan MD       Anesthesia Post-op Note    COLONOSCOPY with cold snare polypectomy    Procedure Summary       Date: 07/22/25 Room / Location:  ENDOSCOPY 03 / [facility]    Anesthesia Start: 1311 Anesthesia Stop: 1432    Procedure: COLONOSCOPY with cold snare polypectomy Diagnosis:       Positive colorectal cancer screening using Cologuard test      (colon polyp,)    Surgeons: Chaparro Richards MD Anesthesiologist: Sheila Braun MD    Anesthesia Type: MAC ASA Status: 3            Anesthesia Type: MAC    Vitals Value Taken Time   /86 07/22/25 14:50   Temp  07/22/25 14:54   Pulse 73 07/22/25 14:54   Resp 18 07/22/25 14:30   SpO2 99 % 07/22/25 14:54   Vitals shown include unfiled device data.        Patient Location: Endoscopy    Anesthesia Type: MAC    Airway Patency: patent    Postop Pain Control: adequate    Mental Status: preanesthetic baseline    Nausea/Vomiting: none    Cardiopulmonary/Hydration status: stable euvolemic    Complications: no apparent anesthesia related complications    Postop vital signs: stable    Dental Exam: Unchanged from Preop    Patient to be discharged home when criteria met.

## (undated) DEVICE — KIT MFLD FOR SPEC COLL

## (undated) DEVICE — VALVE AIR/H20 DEFENDO SCT BX

## (undated) DEVICE — KIT CUSTOM ENDOPROCEDURE STERIS

## (undated) DEVICE — SNARE OVL ROT 10MM  230CM CLD ONLY

## (undated) DEVICE — CANISTER SUCT 1200CC LID BLU HRD ADPT AUTO

## (undated) DEVICE — ELECTRODE MONIT RED DOT 3PK

## (undated) DEVICE — FILTERLINE NASAL ADULT O2/CO2

## (undated) NOTE — ED AVS SNAPSHOT
[facility] [address] [facility]    Phone:  [phone]    Fax:  [address]   MRN: [id number]    Department[facility]  [facility] in [place]   Date of Visit IF THERE IS ANY CHANGE OR WORSENING OF YOUR CONDITION, CALL YOUR PRIMARY CARE PHYSICIAN AT ONCE OR RETURN IMMEDIATELY TO THE EMERGENCY DEPARTMENT.     If you have been prescribed any medication(s), please fill your prescription right away and begin taking t

## (undated) NOTE — ED AVS SNAPSHOT
[facility] Emergency Department in [address] [facility]    Phone:  [phone]    Fax:  [address]   MRN: [id number]    Department:  [facility] Emergency Department in [place]   Date of Visit Bring a paper prescription for each of these medications    - HYDROcodone-acetaminophen 5-325 MG Tabs            Discharge References/Attachments     MONONUCLEOSIS (ENGLISH)      Disclosure     Insurance plans vary and the physician(s) referred by the ER CARE PHYSICIAN AT ONCE OR RETURN IMMEDIATELY TO THE EMERGENCY DEPARTMENT.     If you have been prescribed any medication(s), please fill your prescription right away and begin taking the medication(s) as directed    If the emergency physician has read X-ray coverage. Patient [address] is a Federal Navigator program that can help with your Affordable Care Act coverage, as well as all types of Medicaid plans.   To get signed up and covered, please call [phone] and ask to get set up for an insuran measures 2.4 x 1.5 x 1.9 cm. Largest lymph node on the right measures 1.4 x 1.1 x 1.2 cm. Lymph nodes   likely correspond with palpable abnormality. There a few mildly prominent submandibular and level IV lymph nodes.  No significant vasculature abnormaliti